# Patient Record
Sex: FEMALE | Race: WHITE | NOT HISPANIC OR LATINO | URBAN - METROPOLITAN AREA
[De-identification: names, ages, dates, MRNs, and addresses within clinical notes are randomized per-mention and may not be internally consistent; named-entity substitution may affect disease eponyms.]

---

## 2017-09-25 RX ORDER — TRIAMCINOLONE ACETONIDE 0.1 %
CREAM (GRAM) TOPICAL
Qty: 1 | Refills: 2

## 2017-09-28 ENCOUNTER — APPOINTMENT (RX ONLY)
Dept: URBAN - METROPOLITAN AREA CLINIC 349 | Facility: CLINIC | Age: 67
Setting detail: DERMATOLOGY
End: 2017-09-28

## 2017-09-28 DIAGNOSIS — I87.2 VENOUS INSUFFICIENCY (CHRONIC) (PERIPHERAL): ICD-10-CM | Status: IMPROVED

## 2017-09-28 DIAGNOSIS — D22 MELANOCYTIC NEVI: ICD-10-CM | Status: STABLE

## 2017-09-28 PROBLEM — D22.62 MELANOCYTIC NEVI OF LEFT UPPER LIMB, INCLUDING SHOULDER: Status: ACTIVE | Noted: 2017-09-28

## 2017-09-28 PROBLEM — I83.10 VARICOSE VEINS OF UNSPECIFIED LOWER EXTREMITY WITH INFLAMMATION: Status: ACTIVE | Noted: 2017-09-28

## 2017-09-28 PROBLEM — D22.5 MELANOCYTIC NEVI OF TRUNK: Status: ACTIVE | Noted: 2017-09-28

## 2017-09-28 PROBLEM — I10 ESSENTIAL (PRIMARY) HYPERTENSION: Status: ACTIVE | Noted: 2017-09-28

## 2017-09-28 PROBLEM — D22.71 MELANOCYTIC NEVI OF RIGHT LOWER LIMB, INCLUDING HIP: Status: ACTIVE | Noted: 2017-09-28

## 2017-09-28 PROBLEM — D22.61 MELANOCYTIC NEVI OF RIGHT UPPER LIMB, INCLUDING SHOULDER: Status: ACTIVE | Noted: 2017-09-28

## 2017-09-28 PROBLEM — D22.72 MELANOCYTIC NEVI OF LEFT LOWER LIMB, INCLUDING HIP: Status: ACTIVE | Noted: 2017-09-28

## 2017-09-28 PROCEDURE — 99214 OFFICE O/P EST MOD 30 MIN: CPT

## 2017-09-28 PROCEDURE — ? COUNSELING

## 2017-09-28 PROCEDURE — ? PRESCRIPTION

## 2017-09-28 PROCEDURE — ? MEDICAL PHOTOGRAPHY REVIEW

## 2017-09-28 ASSESSMENT — LOCATION SIMPLE DESCRIPTION DERM
LOCATION SIMPLE: RIGHT LOWER BACK
LOCATION SIMPLE: RIGHT POSTERIOR UPPER ARM
LOCATION SIMPLE: RIGHT POSTERIOR THIGH
LOCATION SIMPLE: LEFT POSTERIOR THIGH
LOCATION SIMPLE: LEFT POSTERIOR UPPER ARM

## 2017-09-28 ASSESSMENT — SEVERITY ASSESSMENT: SEVERITY: MILD

## 2017-09-28 ASSESSMENT — PAIN INTENSITY VAS: HOW INTENSE IS YOUR PAIN 0 BEING NO PAIN, 10 BEING THE MOST SEVERE PAIN POSSIBLE?: NO PAIN

## 2017-09-28 ASSESSMENT — LOCATION DETAILED DESCRIPTION DERM
LOCATION DETAILED: RIGHT INFERIOR LATERAL MIDBACK
LOCATION DETAILED: RIGHT PROXIMAL POSTERIOR UPPER ARM
LOCATION DETAILED: RIGHT PROXIMAL POSTERIOR THIGH
LOCATION DETAILED: LEFT DISTAL LATERAL POSTERIOR THIGH
LOCATION DETAILED: LEFT PROXIMAL POSTERIOR UPPER ARM

## 2017-09-28 ASSESSMENT — LOCATION ZONE DERM
LOCATION ZONE: LEG
LOCATION ZONE: ARM
LOCATION ZONE: TRUNK

## 2017-09-28 NOTE — PROCEDURE: MEDICAL PHOTOGRAPHY REVIEW
Detail Level: Generalized
Number Of Photographs Reviewed: 4
Review Findings: no new or changing lesions

## 2018-04-03 PROBLEM — E66.01 OBESITY, MORBID (HCC): Status: ACTIVE | Noted: 2018-04-03

## 2018-05-07 PROBLEM — I48.0 PAROXYSMAL ATRIAL FIBRILLATION (HCC): Status: ACTIVE | Noted: 2018-05-07

## 2018-05-22 ENCOUNTER — ANESTHESIA EVENT (OUTPATIENT)
Dept: SURGERY | Age: 68
End: 2018-05-22
Payer: MEDICARE

## 2018-05-22 ENCOUNTER — HOSPITAL ENCOUNTER (OUTPATIENT)
Dept: LAB | Age: 68
Discharge: HOME | End: 2018-05-22

## 2018-05-22 PROCEDURE — 88305 TISSUE EXAM BY PATHOLOGIST: CPT | Performed by: INTERNAL MEDICINE

## 2018-05-23 ENCOUNTER — HOSPITAL ENCOUNTER (OUTPATIENT)
Age: 68
Setting detail: OUTPATIENT SURGERY
Discharge: HOME | End: 2018-05-23
Attending: UROLOGY | Admitting: UROLOGY
Payer: MEDICARE

## 2018-05-23 ENCOUNTER — ANESTHESIA (OUTPATIENT)
Dept: SURGERY | Age: 68
End: 2018-05-23
Payer: MEDICARE

## 2018-05-23 VITALS
WEIGHT: 266.19 LBS | SYSTOLIC BLOOD PRESSURE: 151 MMHG | TEMPERATURE: 98.1 F | HEIGHT: 65 IN | OXYGEN SATURATION: 94 % | HEART RATE: 59 BPM | DIASTOLIC BLOOD PRESSURE: 67 MMHG | BODY MASS INDEX: 44.35 KG/M2 | RESPIRATION RATE: 14 BRPM

## 2018-05-23 PROCEDURE — 77030020782 HC GWN BAIR PAWS FLX 3M -B: Performed by: ANESTHESIOLOGY

## 2018-05-23 PROCEDURE — 77030008703 HC TU ET UNCUF COVD -A: Performed by: ANESTHESIOLOGY

## 2018-05-23 PROCEDURE — 74011250636 HC RX REV CODE- 250/636: Performed by: ANESTHESIOLOGY

## 2018-05-23 PROCEDURE — 76010000138 HC OR TIME 0.5 TO 1 HR: Performed by: UROLOGY

## 2018-05-23 PROCEDURE — 74011250637 HC RX REV CODE- 250/637: Performed by: ANESTHESIOLOGY

## 2018-05-23 PROCEDURE — 77030032490 HC SLV COMPR SCD KNE COVD -B: Performed by: UROLOGY

## 2018-05-23 PROCEDURE — 77030019908 HC STETH ESOPH SIMS -A: Performed by: ANESTHESIOLOGY

## 2018-05-23 PROCEDURE — 76210000020 HC REC RM PH II FIRST 0.5 HR: Performed by: UROLOGY

## 2018-05-23 PROCEDURE — 74011250636 HC RX REV CODE- 250/636

## 2018-05-23 PROCEDURE — 77030021678 HC GLIDESCP STAT DISP VERT -B: Performed by: ANESTHESIOLOGY

## 2018-05-23 PROCEDURE — 77030008477 HC STYL SATN SLP COVD -A: Performed by: ANESTHESIOLOGY

## 2018-05-23 PROCEDURE — 76060000033 HC ANESTHESIA 1 TO 1.5 HR: Performed by: UROLOGY

## 2018-05-23 PROCEDURE — 74011250636 HC RX REV CODE- 250/636: Performed by: UROLOGY

## 2018-05-23 PROCEDURE — 50590 FRAGMENTING OF KIDNEY STONE: CPT | Performed by: UROLOGY

## 2018-05-23 PROCEDURE — 74011000250 HC RX REV CODE- 250

## 2018-05-23 PROCEDURE — 76210000016 HC OR PH I REC 1 TO 1.5 HR: Performed by: UROLOGY

## 2018-05-23 RX ORDER — MIDAZOLAM HYDROCHLORIDE 1 MG/ML
2 INJECTION, SOLUTION INTRAMUSCULAR; INTRAVENOUS
Status: DISCONTINUED | OUTPATIENT
Start: 2018-05-23 | End: 2018-05-23 | Stop reason: HOSPADM

## 2018-05-23 RX ORDER — HYDROMORPHONE HYDROCHLORIDE 2 MG/ML
0.5 INJECTION, SOLUTION INTRAMUSCULAR; INTRAVENOUS; SUBCUTANEOUS
Status: DISCONTINUED | OUTPATIENT
Start: 2018-05-23 | End: 2018-05-23 | Stop reason: HOSPADM

## 2018-05-23 RX ORDER — SODIUM CHLORIDE, SODIUM LACTATE, POTASSIUM CHLORIDE, CALCIUM CHLORIDE 600; 310; 30; 20 MG/100ML; MG/100ML; MG/100ML; MG/100ML
75 INJECTION, SOLUTION INTRAVENOUS CONTINUOUS
Status: DISCONTINUED | OUTPATIENT
Start: 2018-05-23 | End: 2018-05-23 | Stop reason: HOSPADM

## 2018-05-23 RX ORDER — FENTANYL CITRATE 50 UG/ML
INJECTION, SOLUTION INTRAMUSCULAR; INTRAVENOUS AS NEEDED
Status: DISCONTINUED | OUTPATIENT
Start: 2018-05-23 | End: 2018-05-23 | Stop reason: HOSPADM

## 2018-05-23 RX ORDER — LIDOCAINE HYDROCHLORIDE 10 MG/ML
0.1 INJECTION INFILTRATION; PERINEURAL AS NEEDED
Status: DISCONTINUED | OUTPATIENT
Start: 2018-05-23 | End: 2018-05-23 | Stop reason: HOSPADM

## 2018-05-23 RX ORDER — FENTANYL CITRATE 50 UG/ML
100 INJECTION, SOLUTION INTRAMUSCULAR; INTRAVENOUS ONCE
Status: DISCONTINUED | OUTPATIENT
Start: 2018-05-23 | End: 2018-05-23 | Stop reason: HOSPADM

## 2018-05-23 RX ORDER — ONDANSETRON 2 MG/ML
4 INJECTION INTRAMUSCULAR; INTRAVENOUS ONCE
Status: DISCONTINUED | OUTPATIENT
Start: 2018-05-23 | End: 2018-05-23 | Stop reason: HOSPADM

## 2018-05-23 RX ORDER — NALOXONE HYDROCHLORIDE 0.4 MG/ML
0.1 INJECTION, SOLUTION INTRAMUSCULAR; INTRAVENOUS; SUBCUTANEOUS AS NEEDED
Status: DISCONTINUED | OUTPATIENT
Start: 2018-05-23 | End: 2018-05-23 | Stop reason: HOSPADM

## 2018-05-23 RX ORDER — DEXAMETHASONE SODIUM PHOSPHATE 4 MG/ML
INJECTION, SOLUTION INTRA-ARTICULAR; INTRALESIONAL; INTRAMUSCULAR; INTRAVENOUS; SOFT TISSUE AS NEEDED
Status: DISCONTINUED | OUTPATIENT
Start: 2018-05-23 | End: 2018-05-23 | Stop reason: HOSPADM

## 2018-05-23 RX ORDER — OXYCODONE HYDROCHLORIDE 5 MG/1
5 TABLET ORAL
Status: DISCONTINUED | OUTPATIENT
Start: 2018-05-23 | End: 2018-05-23 | Stop reason: HOSPADM

## 2018-05-23 RX ORDER — ALBUTEROL SULFATE 0.83 MG/ML
2.5 SOLUTION RESPIRATORY (INHALATION) AS NEEDED
Status: DISCONTINUED | OUTPATIENT
Start: 2018-05-23 | End: 2018-05-23 | Stop reason: HOSPADM

## 2018-05-23 RX ORDER — CIPROFLOXACIN 2 MG/ML
400 INJECTION, SOLUTION INTRAVENOUS ONCE
Status: COMPLETED | OUTPATIENT
Start: 2018-05-23 | End: 2018-05-23

## 2018-05-23 RX ORDER — SODIUM CHLORIDE, SODIUM LACTATE, POTASSIUM CHLORIDE, CALCIUM CHLORIDE 600; 310; 30; 20 MG/100ML; MG/100ML; MG/100ML; MG/100ML
100 INJECTION, SOLUTION INTRAVENOUS CONTINUOUS
Status: DISCONTINUED | OUTPATIENT
Start: 2018-05-23 | End: 2018-05-23 | Stop reason: HOSPADM

## 2018-05-23 RX ORDER — NALOXONE HYDROCHLORIDE 0.4 MG/ML
0.2 INJECTION, SOLUTION INTRAMUSCULAR; INTRAVENOUS; SUBCUTANEOUS AS NEEDED
Status: DISCONTINUED | OUTPATIENT
Start: 2018-05-23 | End: 2018-05-23 | Stop reason: HOSPADM

## 2018-05-23 RX ORDER — NEOSTIGMINE METHYLSULFATE 1 MG/ML
INJECTION, SOLUTION INTRAVENOUS AS NEEDED
Status: DISCONTINUED | OUTPATIENT
Start: 2018-05-23 | End: 2018-05-23 | Stop reason: HOSPADM

## 2018-05-23 RX ORDER — ONDANSETRON 2 MG/ML
INJECTION INTRAMUSCULAR; INTRAVENOUS AS NEEDED
Status: DISCONTINUED | OUTPATIENT
Start: 2018-05-23 | End: 2018-05-23 | Stop reason: HOSPADM

## 2018-05-23 RX ORDER — ROCURONIUM BROMIDE 10 MG/ML
INJECTION, SOLUTION INTRAVENOUS AS NEEDED
Status: DISCONTINUED | OUTPATIENT
Start: 2018-05-23 | End: 2018-05-23 | Stop reason: HOSPADM

## 2018-05-23 RX ORDER — PROPOFOL 10 MG/ML
INJECTION, EMULSION INTRAVENOUS AS NEEDED
Status: DISCONTINUED | OUTPATIENT
Start: 2018-05-23 | End: 2018-05-23 | Stop reason: HOSPADM

## 2018-05-23 RX ORDER — MIDAZOLAM HYDROCHLORIDE 1 MG/ML
2 INJECTION, SOLUTION INTRAMUSCULAR; INTRAVENOUS ONCE
Status: DISCONTINUED | OUTPATIENT
Start: 2018-05-23 | End: 2018-05-23 | Stop reason: HOSPADM

## 2018-05-23 RX ORDER — OXYCODONE HYDROCHLORIDE 5 MG/1
10 TABLET ORAL
Status: DISCONTINUED | OUTPATIENT
Start: 2018-05-23 | End: 2018-05-23 | Stop reason: HOSPADM

## 2018-05-23 RX ORDER — GLYCOPYRROLATE 0.2 MG/ML
INJECTION INTRAMUSCULAR; INTRAVENOUS AS NEEDED
Status: DISCONTINUED | OUTPATIENT
Start: 2018-05-23 | End: 2018-05-23 | Stop reason: HOSPADM

## 2018-05-23 RX ORDER — SODIUM CHLORIDE 0.9 % (FLUSH) 0.9 %
5-10 SYRINGE (ML) INJECTION AS NEEDED
Status: DISCONTINUED | OUTPATIENT
Start: 2018-05-23 | End: 2018-05-23 | Stop reason: HOSPADM

## 2018-05-23 RX ORDER — FAMOTIDINE 20 MG/1
20 TABLET, FILM COATED ORAL ONCE
Status: COMPLETED | OUTPATIENT
Start: 2018-05-23 | End: 2018-05-23

## 2018-05-23 RX ORDER — OXYCODONE AND ACETAMINOPHEN 5; 325 MG/1; MG/1
1 TABLET ORAL AS NEEDED
Status: DISCONTINUED | OUTPATIENT
Start: 2018-05-23 | End: 2018-05-23 | Stop reason: HOSPADM

## 2018-05-23 RX ORDER — DIPHENHYDRAMINE HYDROCHLORIDE 50 MG/ML
12.5 INJECTION, SOLUTION INTRAMUSCULAR; INTRAVENOUS
Status: DISCONTINUED | OUTPATIENT
Start: 2018-05-23 | End: 2018-05-23 | Stop reason: HOSPADM

## 2018-05-23 RX ORDER — LIDOCAINE HYDROCHLORIDE 20 MG/ML
INJECTION, SOLUTION EPIDURAL; INFILTRATION; INTRACAUDAL; PERINEURAL AS NEEDED
Status: DISCONTINUED | OUTPATIENT
Start: 2018-05-23 | End: 2018-05-23 | Stop reason: HOSPADM

## 2018-05-23 RX ADMIN — DEXAMETHASONE SODIUM PHOSPHATE 4 MG: 4 INJECTION, SOLUTION INTRA-ARTICULAR; INTRALESIONAL; INTRAMUSCULAR; INTRAVENOUS; SOFT TISSUE at 11:09

## 2018-05-23 RX ADMIN — LIDOCAINE HYDROCHLORIDE 60 MG: 20 INJECTION, SOLUTION EPIDURAL; INFILTRATION; INTRACAUDAL; PERINEURAL at 10:54

## 2018-05-23 RX ADMIN — LIDOCAINE HYDROCHLORIDE 40 MG: 20 INJECTION, SOLUTION EPIDURAL; INFILTRATION; INTRACAUDAL; PERINEURAL at 11:32

## 2018-05-23 RX ADMIN — NEOSTIGMINE METHYLSULFATE 3 MG: 1 INJECTION, SOLUTION INTRAVENOUS at 11:33

## 2018-05-23 RX ADMIN — SODIUM CHLORIDE, SODIUM LACTATE, POTASSIUM CHLORIDE, AND CALCIUM CHLORIDE 75 ML/HR: 600; 310; 30; 20 INJECTION, SOLUTION INTRAVENOUS at 08:54

## 2018-05-23 RX ADMIN — GLYCOPYRROLATE 0.4 MG: 0.2 INJECTION INTRAMUSCULAR; INTRAVENOUS at 11:33

## 2018-05-23 RX ADMIN — CIPROFLOXACIN 400 MG: 2 INJECTION, SOLUTION INTRAVENOUS at 10:50

## 2018-05-23 RX ADMIN — ROCURONIUM BROMIDE 30 MG: 10 INJECTION, SOLUTION INTRAVENOUS at 10:54

## 2018-05-23 RX ADMIN — PROPOFOL 200 MG: 10 INJECTION, EMULSION INTRAVENOUS at 10:54

## 2018-05-23 RX ADMIN — FENTANYL CITRATE 100 MCG: 50 INJECTION, SOLUTION INTRAMUSCULAR; INTRAVENOUS at 10:49

## 2018-05-23 RX ADMIN — FAMOTIDINE 20 MG: 20 TABLET, FILM COATED ORAL at 08:55

## 2018-05-23 RX ADMIN — ONDANSETRON 4 MG: 2 INJECTION INTRAMUSCULAR; INTRAVENOUS at 11:31

## 2018-05-23 NOTE — IP AVS SNAPSHOT
Mai Raphael 
 
 
 [address] [facility] [address] 
[phone] Patient: Eloina Fung MRN: [id number] DBX:[id number] About your hospitalization You were admitted on:  May 23, 2018 You last received care in the:  [facility] PACU You were discharged on:  May 23, 2018 Why you were hospitalized Your primary diagnosis was:  Not on File Follow-up Information Follow up With Details Comments Contact Info MD Urvashi Dash [address] [facility] [address] [phone] Jordy Tovar MD  You need to see Alfred Jones NP, in 2-3 weeks with JOY. Office will call you to schedule this. [address] [facility] [address] [phone] Discharge Orders None A check vianney indicates which time of day the medication should be taken. My Medications CHANGE how you take these medications Instructions Each Dose to Equal  
 Morning Noon Evening Bedtime  
 levothyroxine 175 mcg tablet Commonly known as:  SYNTHROID What changed:   
- when to take this 
- additional instructions Your last dose was: Your next dose is: One daily  
     
   
   
   
  
 olmesartan-hydroCHLOROthiazide 40-12.5 mg per tablet Commonly known as:  BENICAR HCT What changed:  how much to take Your last dose was: Your next dose is: Take 1 Tab by mouth daily. 1 Tab  
    
   
   
   
  
 rivaroxaban 20 mg Tab tablet Commonly known as:  Elham Lemon What changed:  additional instructions Your last dose was: Your next dose is: Take 1 Tab by mouth daily (with dinner). 20 mg  
    
   
   
   
  
 rosuvastatin 20 mg tablet Commonly known as:  CRESTOR What changed:   
- how much to take 
- additional instructions Your last dose was: Your next dose is: Take 1 Tab by mouth nightly.   
 20 mg  
    
   
   
   
  
  
 CONTINUE taking these medications Instructions Each Dose to Equal  
 Morning Noon Evening Bedtime  
 acetaminophen 325 mg tablet Commonly known as:  TYLENOL Your last dose was: Your next dose is: Take  by mouth every four (4) hours as needed for Pain. COLLAGEN PLUS VITAMIN C PO Your last dose was: Your next dose is: Take  by mouth two (2) times a day. flecainide 50 mg tablet Commonly known as:  TAMBOCOR Your last dose was: Your next dose is: TAKE 1 TABLET BY MOUTH TWICE A DAY  
     
   
   
   
  
 magnesium oxide 500 mg Tab Your last dose was: Your next dose is: Take 500 mg by mouth two (2) times a day. 500 mg  
    
   
   
   
  
 melatonin 5 mg Cap capsule Your last dose was: Your next dose is: Take 5 mg by mouth nightly. 5 mg OSTEO BI-FLEX PO Your last dose was: Your next dose is: Take  by mouth two (2) times a day. Last dose 5/19/18 STOOL SOFTENER PO Your last dose was: Your next dose is: Take  by mouth. TURMERIC ROOT EXTRACT PO Your last dose was: Your next dose is: Take  by mouth nightly. Last dose 5/20/18 Discharge Instructions ACTIVITY · As tolerated and as directed by your doctor. · Walking and mild exercise help to pass the stone fragments. DIET · Clear liquids until no nausea and vomiting; then resume light diet for the first day · Advance to regular diet on second day, unless your doctor orders otherwise. · If nauseated and/ or vomiting, call your doctor. · Drink at least 8 glasses of water a day (avoid caffeinated beverages). PAIN 
· Take pain medication as directed. · Call your doctor if pain is NOT relieved by medication. · DO NOT take aspirin or blood thinners until directed by your doctor. CALL YOUR DOCTOR IF  
· Expect blood-tinged urine. Call your doctor if it lasts more than 72 hours or if you cannot see through the urine. · Aches, chills, or fever of 101 degree F or above Lithotripsy does not make your stone disappear. The treatment is meant to crush your stone so that the fragments can be passed. Strain your urine, save any fragments, and take them to your doctor. The fragments may not begin to pass for up to 1-2 months. You may experience slight bruising at the treated site. DISCHARGE SUMMARY from Nurse PATIENT INSTRUCTIONS: 
 
 
After general anesthesia or intravenous sedation, for 24 hours or while taking prescription Narcotics: · Limit your activities · Do not drive and operate hazardous machinery · Do not make important personal or business decisions · Do  not drink alcoholic beverages · If you have not urinated within 8 hours after discharge, please contact your surgeon on call. *  Please give a list of your current medications to your Primary Care Provider. *  Please update this list whenever your medications are discontinued, doses are 
    changed, or new medications (including over-the-counter products) are added. *  Please carry medication information at all times in case of emergency situations. These are general instructions for a healthy lifestyle: No smoking/ No tobacco products/ Avoid exposure to second hand smoke Surgeon General's Warning:  Quitting smoking now greatly reduces serious risk to your health. Obesity, smoking, and sedentary lifestyle greatly increases your risk for illness A healthy diet, regular physical exercise & weight monitoring are important for maintaining a healthy lifestyle You may be retaining fluid if you have a history of heart failure or if you experience any of the following symptoms:  Weight gain of 3 pounds or more overnight or 5 pounds in a week, increased swelling in our hands or feet or shortness of breath while lying flat in bed. Please call your doctor as soon as you notice any of these symptoms; do not wait until your next office visit. Recognize signs and symptoms of STROKE: 
 
F-face looks uneven A-arms unable to move or move unevenly S-speech slurred or non-existent T-time-call 911 as soon as signs and symptoms begin-DO NOT go Back to bed or wait to see if you get better-TIME IS BRAIN. ACO Transitions of Care Introducing [employer] [address] Hurt offers a voluntary care coordination program to provide high quality service and care to [facility] fee-for-service beneficiaries. Stuttgartel Dunne was designed to help you enhance your health and well-being through the following services: ? Transitions of Care  support for individuals who are transitioning from one care setting to another (example: Hospital to home). ? Chronic and Complex Care Coordination  support for individuals and caregivers of those with serious or chronic illnesses or with more than one chronic (ongoing) condition and those who take a number of different medications. If you meet specific medical criteria, a On license of [facility] may call you directly to coordinate your care with your primary care physician and your other care providers. For questions about the [facility] programs, please, contact your physicians office. For general questions or additional information about Accountable Care Organizations: 
Please visit www.medicare.gov/acos. html or call 1-800-MEDICARE (7-751.652.9386) TTY users should call 4-500.764.8822. Introducing \[facility]\"" & HEALTH SERVICES! Dear Renee Bateman: 
Thank you for requesting a [email or web address] account.   Our records indicate that you already have an active [employer] account. You can access your account anytime at [email or web address] Did you know that you can access your hospital and ER discharge instructions at any time in [employer]? You can also review all of your test results from your hospital stay or ER visit. Additional Information If you have questions, please visit the Frequently Asked Questions section of the [employer] website at [email or web address] Remember, [employer] is NOT to be used for urgent needs. For medical emergencies, dial 911. Now available from your iPhone and Android! Introducing Rashi Mata As a [employer] patient, I wanted to make you aware of our electronic visit tool called Rashi Mata. [employer] 24/7 allows you to connect within minutes with a medical provider 24 hours a day, seven days a week via a mobile device or tablet or logging into a secure website from your computer. You can access Rashi Mata from anywhere in the [place]. A virtual visit might be right for you when you have a simple condition and feel like you just dont want to get out of bed, or cant get away from work for an appointment, when your regular [employer] provider is not available (evenings, weekends or holidays), or when youre out of town and need minor care. Electronic visits cost only $49 and if the [employer] 24/7 provider determines a prescription is needed to treat your condition, one can be electronically transmitted to a nearby pharmacy*. Please take a moment to enroll today if you have not already done so. The enrollment process is free and takes just a few minutes. To enroll, please download the [employer] 24/7 barbra to your tablet or phone, or visit [email or web address] to enroll on your computer.    
And, as an [address] [place] [address] patient with a [employer] account, the results of your visits will be scanned into your electronic medical record and your primary care provider will be able to view the scanned results. We urge you to continue to see your regular [place] provider for your ongoing medical care. And while your primary care provider may not be the one available when you seek a Rashi Valentinofin virtual visit, the peace of mind you get from getting a real diagnosis real time can be priceless. For more information on Rashi Betterflyabelfin, view our Frequently Asked Questions (FAQs) at [email or web address]. Sincerely, 
 
Aaron Madrigal MD 
Chief Medical Officer Nicola Hurt *:  certain medications cannot be prescribed via [employer] Providers Seen During Your Hospitalization Provider Specialty Primary office phone Radha Kern MD Urology [phone] Your Primary Care Physician (PCP) Primary Care Physician Office Phone Office Fax Bin Aylin [phone] You are allergic to the following Allergen Reactions Penicillins Anaphylaxis \"knots under skin and then SOB\"- \"I had it for 10 days before it happened\" Recent Documentation Height Weight BMI OB Status Smoking Status 1.651 m 120.7 kg 44.3 kg/m2 Hysterectomy Never Smoker Emergency Contacts Name Discharge Info Relation Home Work Mobile Silk [5] [phone] Patient Belongings The following personal items are in your possession at time of discharge: 
  Dental Appliances: None         Home Medications: None   Jewelry: None  Clothing: Shirt, Pants, Footwear    Other Valuables: None Please provide this summary of care documentation to your next provider. Signatures-by signing, you are acknowledging that this After Visit Summary has been reviewed with you and you have received a copy. Patient Signature:  ____________________________________________________________ Date:  ____________________________________________________________  
  
Chel Schuyler Provider Signature:  ____________________________________________________________ Date:  ____________________________________________________________

## 2018-05-23 NOTE — OP NOTES
Operative Note                Patient: Samreen Munoz [id number]    Date of Surgery: 05/23/18    Preoperative Diagnosis: 8 mm RIGHT UPJ stone    Postoperative Diagnosis:  same    Surgeon(s) and Role:     * Terrance Vazquez MD - Primary     Anesthesia:  General     Procedure: Procedure(s):  RIGHT EXTRACORPORAL SHOCKWAVE LITHOTRIPSY (ESWL)     Indications:     Discussed the risk of surgery including infection, hematoma, bleeding, recurrence or persistence of symptoms or stone, and the risks of general anesthetic. The patient understands the risks, any and all questions were answered to the patient's satisfaction and they freely signed the consent for operation. Procedure in Detail:  Patient was taken to the lithotripsy suite. Anesthesia was induced via the anesthesiology service. Using flouroscopy for guidance, a total of 3000 shocks were delivered in a non-gaited fashion. No cardiac ectopy was experienced. Shock rate was begun at 60 shocks per minute and then increased to 90 shocks per minute. Energy level was begun at 7 and gradually increased to a maximum of 11. Findings: Patient tolerated the procedure well. At the end of the procedure, the stone appeared to have fractured.       Estimated Blood Loss:  none    Specimens: * No specimens in log *             Drains: none                 Implants: * No implants in log *           Signed By: Chilango Edwards MD

## 2018-05-23 NOTE — PERIOP NOTES
Preoperative flank skin condition: clear  Lead shield: Yes  Patient ear protection: Yes   Gel applied to patient's flank and Lithotripsy head: Yes   Starting power level: 7  Shock start time (first  fluoroscopy):11:01  Shock stop time (last lithotripsy shock): 11:38  Ending power level: 11  Total shocks: 3000  Total fluoroscopy time: 3.05  Postoperative flank skin condition: red

## 2018-05-23 NOTE — ANESTHESIA PREPROCEDURE EVALUATION
Anesthetic History   No history of anesthetic complications            Review of Systems / Medical History  Patient summary reviewed, nursing notes reviewed and pertinent labs reviewed    Pulmonary  Within defined limits        Undiagnosed apnea         Neuro/Psych   Within defined limits           Cardiovascular    Hypertension: well controlled        Dysrhythmias : atrial fibrillation      Exercise tolerance: >4 METS     GI/Hepatic/Renal         Renal disease: stones       Endo/Other      Hypothyroidism: well controlled  Morbid obesity     Other Findings              Physical Exam    Airway  Mallampati: II  TM Distance: 4 - 6 cm  Neck ROM: normal range of motion   Mouth opening: Normal     Cardiovascular    Rhythm: regular           Dental    Dentition: Caps/crowns     Pulmonary  Breath sounds clear to auscultation               Abdominal  GI exam deferred       Other Findings            Anesthetic Plan    ASA: 3  Anesthesia type: general            Anesthetic plan and risks discussed with: Patient

## 2018-05-23 NOTE — ANESTHESIA POSTPROCEDURE EVALUATION
Post-Anesthesia Evaluation and Assessment    Patient: Foster Mora MRN: [id number]  SSN: [id number]    YOB: 1950  Age: 79 y.o. Sex: female       Cardiovascular Function/Vital Signs  Visit Vitals    /67    Pulse (!) 59    Temp 36.7 °C (98.1 °F)    Resp 14    Ht 5' 5\" (1.651 m)    Wt 120.7 kg (266 lb 3 oz)    SpO2 94%    BMI 44.3 kg/m2       Patient is status post general anesthesia for Procedure(s):  RIGHT LITHOTRIPSY EXTRACORPOREAL SHOCKWAVE ESWL. Nausea/Vomiting: None    Postoperative hydration reviewed and adequate. Pain:  Pain Scale 1: Numeric (0 - 10) (05/23/18 1228)  Pain Intensity 1: 0 (05/23/18 1228)   Managed    Neurological Status:   Neuro (WDL): Within Defined Limits (05/23/18 1228)  Neuro  Neurologic State: Drowsy (05/23/18 1151)  Orientation Level: Oriented X4 (05/23/18 1151)  Cognition: Follows commands (05/23/18 1151)  Speech: Clear (05/23/18 1151)  LUE Motor Response: Purposeful (05/23/18 1151)  LLE Motor Response: Purposeful (05/23/18 1151)  RUE Motor Response: Purposeful (05/23/18 1151)  RLE Motor Response: Purposeful (05/23/18 1151)   At baseline    Mental Status and Level of Consciousness: Arousable    Pulmonary Status:   O2 Device: Room air (05/23/18 1228)   Adequate oxygenation and airway patent    Complications related to anesthesia: None    Post-anesthesia assessment completed.  No concerns    Signed By: Liam Emery MD     May 23, 2018

## 2018-12-21 RX ORDER — TRIAMCINOLONE ACETONIDE 0.1 %
CREAM (GRAM) TOPICAL
Qty: 1 | Refills: 2 | Status: ERX

## 2018-12-27 ENCOUNTER — APPOINTMENT (RX ONLY)
Dept: URBAN - METROPOLITAN AREA CLINIC 349 | Facility: CLINIC | Age: 68
Setting detail: DERMATOLOGY
End: 2018-12-27

## 2018-12-27 DIAGNOSIS — R60.0 LOCALIZED EDEMA: ICD-10-CM

## 2018-12-27 DIAGNOSIS — I87.2 VENOUS INSUFFICIENCY (CHRONIC) (PERIPHERAL): ICD-10-CM | Status: STABLE

## 2018-12-27 DIAGNOSIS — L57.0 ACTINIC KERATOSIS: ICD-10-CM

## 2018-12-27 DIAGNOSIS — D22 MELANOCYTIC NEVI: ICD-10-CM | Status: STABLE

## 2018-12-27 PROBLEM — D22.5 MELANOCYTIC NEVI OF TRUNK: Status: ACTIVE | Noted: 2018-12-27

## 2018-12-27 PROBLEM — L55.1 SUNBURN OF SECOND DEGREE: Status: ACTIVE | Noted: 2018-12-27

## 2018-12-27 PROBLEM — M12.9 ARTHROPATHY, UNSPECIFIED: Status: ACTIVE | Noted: 2018-12-27

## 2018-12-27 PROBLEM — E03.9 HYPOTHYROIDISM, UNSPECIFIED: Status: ACTIVE | Noted: 2018-12-27

## 2018-12-27 PROCEDURE — ? OTHER

## 2018-12-27 PROCEDURE — 17000 DESTRUCT PREMALG LESION: CPT

## 2018-12-27 PROCEDURE — ? MEDICAL PHOTOGRAPHY REVIEW

## 2018-12-27 PROCEDURE — 99214 OFFICE O/P EST MOD 30 MIN: CPT | Mod: 25

## 2018-12-27 PROCEDURE — ? PRESCRIPTION

## 2018-12-27 PROCEDURE — ? LIQUID NITROGEN

## 2018-12-27 PROCEDURE — ? COUNSELING

## 2018-12-27 ASSESSMENT — LOCATION SIMPLE DESCRIPTION DERM
LOCATION SIMPLE: RIGHT LOWER BACK
LOCATION SIMPLE: LEFT PRETIBIAL REGION
LOCATION SIMPLE: LEFT ZYGOMA
LOCATION SIMPLE: RIGHT PRETIBIAL REGION

## 2018-12-27 ASSESSMENT — LOCATION DETAILED DESCRIPTION DERM
LOCATION DETAILED: LEFT LATERAL ZYGOMA
LOCATION DETAILED: LEFT DISTAL PRETIBIAL REGION
LOCATION DETAILED: RIGHT DISTAL PRETIBIAL REGION
LOCATION DETAILED: RIGHT INFERIOR LATERAL MIDBACK

## 2018-12-27 ASSESSMENT — LOCATION ZONE DERM
LOCATION ZONE: FACE
LOCATION ZONE: TRUNK
LOCATION ZONE: LEG

## 2018-12-27 ASSESSMENT — PAIN INTENSITY VAS
HOW INTENSE IS YOUR PAIN 0 BEING NO PAIN, 10 BEING THE MOST SEVERE PAIN POSSIBLE?: 1/10 PAIN
HOW INTENSE IS YOUR PAIN 0 BEING NO PAIN, 10 BEING THE MOST SEVERE PAIN POSSIBLE?: 0

## 2018-12-27 ASSESSMENT — SEVERITY ASSESSMENT: SEVERITY: MILD

## 2018-12-27 NOTE — PROCEDURE: OTHER
Detail Level: Detailed
Other (Free Text): Patient wears compression stockings occasionally.  Patient takes breaks from steroid use every two weeks.\\nPatient gets a diuretic from her Primary care physician occasionally. Patient denies hypertension
Other (Free Text): Patient wears compression stockings occasionally
Note Text (......Xxx Chief Complaint.): This diagnosis correlates with the

## 2018-12-27 NOTE — PROCEDURE: MEDICAL PHOTOGRAPHY REVIEW
Detail Level: Generalized
Review Findings: no new or changing lesions
Number Of Photographs Reviewed: 4

## 2018-12-27 NOTE — PROCEDURE: LIQUID NITROGEN
Render Post-Care Instructions In Note?: no
Number Of Freeze-Thaw Cycles: 2 freeze-thaw cycles
Detail Level: Detailed
Consent: The patient's consent was obtained including but not limited to risks of crusting, scabbing, blistering, scarring, darker or lighter pigmentary change, recurrence, incomplete removal and infection.
Post-Care Instructions: I reviewed with the patient in detail post-care instructions. Patient is to wear sunprotection, and avoid picking at any of the treated lesions. Pt may apply Vaseline to crusted or scabbing areas.
Duration Of Freeze Thaw-Cycle (Seconds): 3

## 2020-01-01 ENCOUNTER — HOSPITAL ENCOUNTER (INPATIENT)
Age: 70
LOS: 1 days | DRG: 871 | End: 2020-01-23
Attending: EMERGENCY MEDICINE | Admitting: INTERNAL MEDICINE
Payer: MEDICARE

## 2020-01-01 ENCOUNTER — APPOINTMENT (OUTPATIENT)
Dept: CT IMAGING | Age: 70
DRG: 871 | End: 2020-01-01
Attending: EMERGENCY MEDICINE
Payer: MEDICARE

## 2020-01-01 ENCOUNTER — APPOINTMENT (OUTPATIENT)
Dept: GENERAL RADIOLOGY | Age: 70
DRG: 871 | End: 2020-01-01
Attending: EMERGENCY MEDICINE
Payer: MEDICARE

## 2020-01-01 ENCOUNTER — APPOINTMENT (OUTPATIENT)
Dept: GENERAL RADIOLOGY | Age: 70
DRG: 871 | End: 2020-01-01
Attending: INTERNAL MEDICINE
Payer: MEDICARE

## 2020-01-01 VITALS
OXYGEN SATURATION: 67 % | RESPIRATION RATE: 28 BRPM | BODY MASS INDEX: 50.02 KG/M2 | DIASTOLIC BLOOD PRESSURE: 34 MMHG | TEMPERATURE: 92.7 F | HEIGHT: 64 IN | WEIGHT: 293 LBS | SYSTOLIC BLOOD PRESSURE: 62 MMHG

## 2020-01-01 DIAGNOSIS — J96.01 ACUTE RESPIRATORY FAILURE WITH HYPOXIA (HCC): ICD-10-CM

## 2020-01-01 DIAGNOSIS — A41.9 SEPSIS, DUE TO UNSPECIFIED ORGANISM, UNSPECIFIED WHETHER ACUTE ORGAN DYSFUNCTION PRESENT (HCC): ICD-10-CM

## 2020-01-01 DIAGNOSIS — R65.21 SEPSIS WITH ACUTE ORGAN DYSFUNCTION AND SEPTIC SHOCK, DUE TO UNSPECIFIED ORGANISM, UNSPECIFIED TYPE (HCC): ICD-10-CM

## 2020-01-01 DIAGNOSIS — A41.9 SEPSIS WITH ACUTE ORGAN DYSFUNCTION AND SEPTIC SHOCK, DUE TO UNSPECIFIED ORGANISM, UNSPECIFIED TYPE (HCC): ICD-10-CM

## 2020-01-01 DIAGNOSIS — E66.01 OBESITY, MORBID (HCC): Chronic | ICD-10-CM

## 2020-01-01 DIAGNOSIS — N17.9 ACUTE RENAL FAILURE, UNSPECIFIED ACUTE RENAL FAILURE TYPE (HCC): ICD-10-CM

## 2020-01-01 DIAGNOSIS — I46.9 CARDIAC ARREST (HCC): Primary | ICD-10-CM

## 2020-01-01 DIAGNOSIS — N30.00 ACUTE CYSTITIS WITHOUT HEMATURIA: ICD-10-CM

## 2020-01-01 LAB
ADMINISTERED INITIALS, ADMINIT: NORMAL
ALBUMIN SERPL-MCNC: 1.4 G/DL (ref 3.2–4.6)
ALBUMIN SERPL-MCNC: 1.9 G/DL (ref 3.2–4.6)
ALBUMIN SERPL-MCNC: 2.2 G/DL (ref 3.2–4.6)
ALBUMIN/GLOB SERPL: 0.5 {RATIO} (ref 1.2–3.5)
ALBUMIN/GLOB SERPL: 0.5 {RATIO} (ref 1.2–3.5)
ALBUMIN/GLOB SERPL: 0.6 {RATIO} (ref 1.2–3.5)
ALP SERPL-CCNC: 219 U/L (ref 50–136)
ALP SERPL-CCNC: 320 U/L (ref 50–136)
ALP SERPL-CCNC: 363 U/L (ref 50–136)
ALT SERPL-CCNC: 112 U/L (ref 12–65)
ALT SERPL-CCNC: 246 U/L (ref 12–65)
ALT SERPL-CCNC: 321 U/L (ref 12–65)
ANION GAP SERPL CALC-SCNC: 10 MMOL/L (ref 7–16)
ANION GAP SERPL CALC-SCNC: 14 MMOL/L (ref 7–16)
ANION GAP SERPL CALC-SCNC: 21 MMOL/L (ref 7–16)
ANION GAP SERPL CALC-SCNC: 24 MMOL/L (ref 7–16)
APPEARANCE UR: ABNORMAL
APPEARANCE UR: ABNORMAL
APTT PPP: 40.7 SEC (ref 24.7–39.8)
APTT PPP: 53.3 SEC (ref 24.7–39.8)
ARTERIAL PATENCY WRIST A: ABNORMAL
ARTERIAL PATENCY WRIST A: YES
AST SERPL-CCNC: 163 U/L (ref 15–37)
AST SERPL-CCNC: 440 U/L (ref 15–37)
AST SERPL-CCNC: 534 U/L (ref 15–37)
ATRIAL RATE: 91 BPM
BACTERIA URNS QL MICRO: ABNORMAL /HPF
BACTERIA URNS QL MICRO: ABNORMAL /HPF
BASE DEFICIT BLD-SCNC: 12 MMOL/L
BASE DEFICIT BLD-SCNC: 12 MMOL/L
BASE DEFICIT BLD-SCNC: 14 MMOL/L
BASE DEFICIT BLD-SCNC: 18 MMOL/L
BASE DEFICIT BLD-SCNC: 8 MMOL/L
BASE DEFICIT BLD-SCNC: 9 MMOL/L
BASOPHILS # BLD: 0 K/UL (ref 0–0.2)
BASOPHILS NFR BLD: 1 % (ref 0–2)
BDY SITE: ABNORMAL
BILIRUB DIRECT SERPL-MCNC: 0.9 MG/DL
BILIRUB SERPL-MCNC: 0.9 MG/DL (ref 0.2–1.1)
BILIRUB SERPL-MCNC: 0.9 MG/DL (ref 0.2–1.1)
BILIRUB SERPL-MCNC: 1.3 MG/DL (ref 0.2–1.1)
BILIRUB UR QL: ABNORMAL
BILIRUB UR QL: ABNORMAL
BLASTS NFR BLD MANUAL: 3 %
BODY TEMPERATURE: 32.5
BODY TEMPERATURE: 33.2
BUN SERPL-MCNC: 55 MG/DL (ref 8–23)
BUN SERPL-MCNC: 58 MG/DL (ref 8–23)
BUN SERPL-MCNC: 65 MG/DL (ref 8–23)
BUN SERPL-MCNC: 68 MG/DL (ref 8–23)
CA-I BLD-MCNC: 0.96 MMOL/L (ref 1.12–1.32)
CALCIUM SERPL-MCNC: 5.7 MG/DL (ref 8.3–10.4)
CALCIUM SERPL-MCNC: 6.4 MG/DL (ref 8.3–10.4)
CALCIUM SERPL-MCNC: 8.1 MG/DL (ref 8.3–10.4)
CALCIUM SERPL-MCNC: 9.5 MG/DL (ref 8.3–10.4)
CALCULATED R AXIS, ECG10: 70 DEGREES
CALCULATED T AXIS, ECG11: 22 DEGREES
CASTS URNS QL MICRO: 0 /LPF
CASTS URNS QL MICRO: ABNORMAL /LPF
CHLORIDE SERPL-SCNC: 101 MMOL/L (ref 98–107)
CHLORIDE SERPL-SCNC: 112 MMOL/L (ref 98–107)
CHLORIDE SERPL-SCNC: 113 MMOL/L (ref 98–107)
CHLORIDE SERPL-SCNC: 99 MMOL/L (ref 98–107)
CK SERPL-CCNC: 532 U/L (ref 21–215)
CK SERPL-CCNC: 536 U/L (ref 21–215)
CO2 BLD-SCNC: 13 MMOL/L
CO2 BLD-SCNC: 16 MMOL/L
CO2 BLD-SCNC: 20 MMOL/L
CO2 BLD-SCNC: 21 MMOL/L
CO2 BLD-SCNC: 25 MMOL/L
CO2 SERPL-SCNC: 16 MMOL/L (ref 21–32)
CO2 SERPL-SCNC: 17 MMOL/L (ref 21–32)
CO2 SERPL-SCNC: 18 MMOL/L (ref 21–32)
CO2 SERPL-SCNC: 21 MMOL/L (ref 21–32)
COLLECT TIME,HTIME: 1310
COLLECT TIME,HTIME: 1440
COLLECT TIME,HTIME: 1600
COLLECT TIME,HTIME: 2045
COLLECT TIME,HTIME: 2350
COLLECT TIME,HTIME: 315
COLOR UR: ABNORMAL
COLOR UR: ABNORMAL
CREAT SERPL-MCNC: 2.93 MG/DL (ref 0.6–1)
CREAT SERPL-MCNC: 2.96 MG/DL (ref 0.6–1)
CREAT SERPL-MCNC: 4.39 MG/DL (ref 0.6–1)
CREAT SERPL-MCNC: 4.59 MG/DL (ref 0.6–1)
CRYSTALS URNS QL MICRO: 0 /LPF
D50 ADMINISTERED, D50ADM: 0 ML
D50 ORDER, D50ORD: 0 ML
DIAGNOSIS, 93000: NORMAL
DIFFERENTIAL METHOD BLD: ABNORMAL
DIFFERENTIAL METHOD BLD: ABNORMAL
EOSINOPHIL # BLD: 0 K/UL (ref 0–0.8)
EOSINOPHIL # BLD: 0.2 K/UL (ref 0–0.8)
EOSINOPHIL NFR BLD MANUAL: 1 % (ref 1–8)
EOSINOPHIL NFR BLD: 0 % (ref 0.5–7.8)
EPI CELLS #/AREA URNS HPF: ABNORMAL /HPF
EPI CELLS #/AREA URNS HPF: ABNORMAL /HPF
ERYTHROCYTE [DISTWIDTH] IN BLOOD BY AUTOMATED COUNT: 13.7 % (ref 11.9–14.6)
ERYTHROCYTE [DISTWIDTH] IN BLOOD BY AUTOMATED COUNT: 13.8 % (ref 11.9–14.6)
ERYTHROCYTE [DISTWIDTH] IN BLOOD BY AUTOMATED COUNT: 14.3 % (ref 11.9–14.6)
EST. AVERAGE GLUCOSE BLD GHB EST-MCNC: 148 MG/DL
EXHALED MINUTE VOLUME, VE: 10 L/MIN
EXHALED MINUTE VOLUME, VE: 13 L/MIN
EXHALED MINUTE VOLUME, VE: 5.5 L/MIN
EXHALED MINUTE VOLUME, VE: 6.5 L/MIN
EXHALED MINUTE VOLUME, VE: 8.5 L/MIN
GAS FLOW.O2 O2 DELIVERY SYS: ABNORMAL L/MIN
GAS FLOW.O2 SETTING OXYMISER: 20 BPM
GAS FLOW.O2 SETTING OXYMISER: 28 BPM
GAS FLOW.O2 SETTING OXYMISER: 28 BPM
GLOBULIN SER CALC-MCNC: 3 G/DL (ref 2.3–3.5)
GLOBULIN SER CALC-MCNC: 3.8 G/DL (ref 2.3–3.5)
GLOBULIN SER CALC-MCNC: 4 G/DL (ref 2.3–3.5)
GLSCOM COMMENTS: NORMAL
GLUCOSE BLD STRIP.AUTO-MCNC: 201 MG/DL (ref 65–100)
GLUCOSE BLD STRIP.AUTO-MCNC: 209 MG/DL (ref 65–100)
GLUCOSE BLD STRIP.AUTO-MCNC: 219 MG/DL (ref 65–100)
GLUCOSE BLD STRIP.AUTO-MCNC: 221 MG/DL (ref 65–100)
GLUCOSE BLD STRIP.AUTO-MCNC: 224 MG/DL (ref 65–100)
GLUCOSE BLD STRIP.AUTO-MCNC: 232 MG/DL (ref 65–100)
GLUCOSE BLD STRIP.AUTO-MCNC: 233 MG/DL (ref 65–100)
GLUCOSE BLD STRIP.AUTO-MCNC: 237 MG/DL (ref 65–100)
GLUCOSE BLD STRIP.AUTO-MCNC: 239 MG/DL (ref 65–100)
GLUCOSE BLD STRIP.AUTO-MCNC: 249 MG/DL (ref 65–100)
GLUCOSE BLD STRIP.AUTO-MCNC: 254 MG/DL (ref 65–100)
GLUCOSE BLD STRIP.AUTO-MCNC: 266 MG/DL (ref 65–100)
GLUCOSE BLD STRIP.AUTO-MCNC: 271 MG/DL (ref 65–100)
GLUCOSE SERPL-MCNC: 196 MG/DL (ref 65–100)
GLUCOSE SERPL-MCNC: 215 MG/DL (ref 65–100)
GLUCOSE SERPL-MCNC: 229 MG/DL (ref 65–100)
GLUCOSE SERPL-MCNC: 253 MG/DL (ref 65–100)
GLUCOSE UR STRIP.AUTO-MCNC: 100 MG/DL
GLUCOSE UR STRIP.AUTO-MCNC: NEGATIVE MG/DL
GLUCOSE, GLC: 209 MG/DL
GLUCOSE, GLC: 219 MG/DL
GLUCOSE, GLC: 221 MG/DL
GLUCOSE, GLC: 224 MG/DL
GLUCOSE, GLC: 232 MG/DL
GLUCOSE, GLC: 233 MG/DL
GLUCOSE, GLC: 237 MG/DL
GLUCOSE, GLC: 239 MG/DL
GLUCOSE, GLC: 249 MG/DL
GLUCOSE, GLC: 254 MG/DL
GLUCOSE, GLC: 266 MG/DL
GLUCOSE, GLC: 271 MG/DL
HBA1C MFR BLD: 6.8 % (ref 4.8–6)
HCO3 BLD-SCNC: 11.8 MMOL/L (ref 22–26)
HCO3 BLD-SCNC: 15 MMOL/L (ref 22–26)
HCO3 BLD-SCNC: 15.7 MMOL/L (ref 22–26)
HCO3 BLD-SCNC: 18.5 MMOL/L (ref 22–26)
HCO3 BLD-SCNC: 19.5 MMOL/L (ref 22–26)
HCO3 BLD-SCNC: 22.9 MMOL/L (ref 22–26)
HCT VFR BLD AUTO: 33.7 % (ref 35.8–46.3)
HCT VFR BLD AUTO: 37.8 % (ref 35.8–46.3)
HCT VFR BLD AUTO: 44 % (ref 35.8–46.3)
HGB BLD-MCNC: 10.4 G/DL (ref 11.7–15.4)
HGB BLD-MCNC: 11.9 G/DL (ref 11.7–15.4)
HGB BLD-MCNC: 13.4 G/DL (ref 11.7–15.4)
HGB UR QL STRIP: ABNORMAL
HGB UR QL STRIP: ABNORMAL
HIGH TARGET, HITG: 250 MG/DL
IMM GRANULOCYTES # BLD AUTO: 0.9 K/UL (ref 0–0.5)
IMM GRANULOCYTES NFR BLD AUTO: 19 % (ref 0–5)
INR PPP: 3.5
INR PPP: 4.1
INSPIRATION.DURATION SETTING TIME VENT: 1 SEC
INSULIN ADMINSTERED, INSADM: 10.3 UNITS/HOUR
INSULIN ADMINSTERED, INSADM: 3.2 UNITS/HOUR
INSULIN ADMINSTERED, INSADM: 3.2 UNITS/HOUR
INSULIN ADMINSTERED, INSADM: 3.5 UNITS/HOUR
INSULIN ADMINSTERED, INSADM: 3.8 UNITS/HOUR
INSULIN ADMINSTERED, INSADM: 5.8 UNITS/HOUR
INSULIN ADMINSTERED, INSADM: 7.5 UNITS/HOUR
INSULIN ADMINSTERED, INSADM: 8.2 UNITS/HOUR
INSULIN ADMINSTERED, INSADM: 8.4 UNITS/HOUR
INSULIN ADMINSTERED, INSADM: 8.6 UNITS/HOUR
INSULIN ADMINSTERED, INSADM: 8.7 UNITS/HOUR
INSULIN ADMINSTERED, INSADM: 9 UNITS/HOUR
INSULIN ORDER, INSORD: 10.3 UNITS/HOUR
INSULIN ORDER, INSORD: 3.2 UNITS/HOUR
INSULIN ORDER, INSORD: 3.2 UNITS/HOUR
INSULIN ORDER, INSORD: 3.5 UNITS/HOUR
INSULIN ORDER, INSORD: 3.8 UNITS/HOUR
INSULIN ORDER, INSORD: 5.8 UNITS/HOUR
INSULIN ORDER, INSORD: 7.5 UNITS/HOUR
INSULIN ORDER, INSORD: 8.2 UNITS/HOUR
INSULIN ORDER, INSORD: 8.4 UNITS/HOUR
INSULIN ORDER, INSORD: 8.6 UNITS/HOUR
INSULIN ORDER, INSORD: 8.7 UNITS/HOUR
INSULIN ORDER, INSORD: 9 UNITS/HOUR
KETONES UR QL STRIP.AUTO: 15 MG/DL
KETONES UR QL STRIP.AUTO: 15 MG/DL
LACTATE SERPL-SCNC: 13 MMOL/L (ref 0.4–2)
LACTATE SERPL-SCNC: 3.1 MMOL/L (ref 0.4–2)
LACTATE SERPL-SCNC: 7.2 MMOL/L (ref 0.4–2)
LEUKOCYTE ESTERASE UR QL STRIP.AUTO: ABNORMAL
LEUKOCYTE ESTERASE UR QL STRIP.AUTO: ABNORMAL
LOW TARGET, LOT: 150 MG/DL
LYMPHOCYTES # BLD: 1.2 K/UL (ref 0.5–4.6)
LYMPHOCYTES # BLD: 8 K/UL (ref 0.5–4.6)
LYMPHOCYTES NFR BLD MANUAL: 33 % (ref 16–44)
LYMPHOCYTES NFR BLD: 25 % (ref 13–44)
MAGNESIUM SERPL-MCNC: 1.8 MG/DL (ref 1.8–2.4)
MAGNESIUM SERPL-MCNC: 2.4 MG/DL (ref 1.8–2.4)
MAGNESIUM SERPL-MCNC: 2.7 MG/DL (ref 1.8–2.4)
MCH RBC QN AUTO: 30.9 PG (ref 26.1–32.9)
MCH RBC QN AUTO: 31.4 PG (ref 26.1–32.9)
MCH RBC QN AUTO: 31.4 PG (ref 26.1–32.9)
MCHC RBC AUTO-ENTMCNC: 30.5 G/DL (ref 31.4–35)
MCHC RBC AUTO-ENTMCNC: 30.9 G/DL (ref 31.4–35)
MCHC RBC AUTO-ENTMCNC: 31.5 G/DL (ref 31.4–35)
MCV RBC AUTO: 100 FL (ref 79.6–97.8)
MCV RBC AUTO: 103 FL (ref 79.6–97.8)
MCV RBC AUTO: 99.7 FL (ref 79.6–97.8)
METAMYELOCYTES NFR BLD MANUAL: 1 %
MINUTES UNTIL NEXT BG, NBG: 60 MIN
MONOCYTES # BLD: 0.3 K/UL (ref 0.1–1.3)
MONOCYTES # BLD: 0.5 K/UL (ref 0.1–1.3)
MONOCYTES NFR BLD MANUAL: 2 % (ref 3–9)
MONOCYTES NFR BLD: 6 % (ref 4–12)
MUCOUS THREADS URNS QL MICRO: 0 /LPF
MULTIPLIER, MUL: 0.02
MULTIPLIER, MUL: 0.03
MULTIPLIER, MUL: 0.04
MULTIPLIER, MUL: 0.05
MYELOCYTES NFR BLD MANUAL: 1 %
NEUTS BAND NFR BLD MANUAL: 2 % (ref 0–10)
NEUTS SEG # BLD: 14.8 K/UL (ref 1.7–8.2)
NEUTS SEG # BLD: 2.2 K/UL (ref 1.7–8.2)
NEUTS SEG NFR BLD MANUAL: 57 % (ref 47–75)
NEUTS SEG NFR BLD: 49 % (ref 43–78)
NITRITE UR QL STRIP.AUTO: POSITIVE
NITRITE UR QL STRIP.AUTO: POSITIVE
NRBC # BLD: 0.03 K/UL (ref 0–0.2)
NRBC # BLD: 0.09 K/UL (ref 0–0.2)
NRBC # BLD: 0.1 K/UL (ref 0–0.2)
NRBC BLD-RTO: 3 PER 100 WBC
O2/TOTAL GAS SETTING VFR VENT: 100 %
ORDER INITIALS, ORDINIT: NORMAL
OTHER OBSERVATIONS,UCOM: ABNORMAL
OTHER OBSERVATIONS,UCOM: ABNORMAL
PCO2 BLD: 40.9 MMHG (ref 35–45)
PCO2 BLD: 42.6 MMHG (ref 35–45)
PCO2 BLD: 47 MMHG (ref 35–45)
PCO2 BLD: 54.8 MMHG (ref 35–45)
PCO2 BLDCO: 54 MMHG (ref 32–68)
PCO2 BLDCO: 63 MMHG (ref 32–68)
PEEP RESPIRATORY: 10 CMH2O
PEEP RESPIRATORY: 8 CMH2O
PH BLD: 7.05 [PH] (ref 7.35–7.45)
PH BLD: 7.11 [PH] (ref 7.35–7.45)
PH BLD: 7.16 [PH] (ref 7.35–7.45)
PH BLD: 7.19 [PH] (ref 7.35–7.45)
PH BLDCO: 7.12 [PH] (ref 7.15–7.38)
PH BLDCO: 7.15 [PH] (ref 7.15–7.38)
PH UR STRIP: 5.5 [PH] (ref 5–9)
PH UR STRIP: 7 [PH] (ref 5–9)
PHOSPHATE SERPL-MCNC: 3.5 MG/DL (ref 2.3–3.7)
PHOSPHATE SERPL-MCNC: 4.4 MG/DL (ref 2.3–3.7)
PLATELET # BLD AUTO: 53 K/UL (ref 150–450)
PLATELET # BLD AUTO: 69 K/UL (ref 150–450)
PLATELET # BLD AUTO: 86 K/UL (ref 150–450)
PLATELET COMMENTS,PCOM: ABNORMAL
PLATELET COMMENTS,PCOM: ABNORMAL
PMV BLD AUTO: 12 FL (ref 9.4–12.3)
PMV BLD AUTO: 12 FL (ref 9.4–12.3)
PMV BLD AUTO: 12.1 FL (ref 9.4–12.3)
PO2 BLD: 203 MMHG (ref 75–100)
PO2 BLD: 63 MMHG (ref 75–100)
PO2 BLD: 63 MMHG (ref 75–100)
PO2 BLD: 70 MMHG (ref 75–100)
PO2 BLDCO: 35 MMHG
PO2 BLDCO: 40 MMHG
POTASSIUM BLD-SCNC: 3.6 MMOL/L (ref 3.5–5.1)
POTASSIUM SERPL-SCNC: 2.5 MMOL/L (ref 3.5–5.1)
POTASSIUM SERPL-SCNC: 3.5 MMOL/L (ref 3.5–5.1)
POTASSIUM SERPL-SCNC: 3.6 MMOL/L (ref 3.5–5.1)
POTASSIUM SERPL-SCNC: 4 MMOL/L (ref 3.5–5.1)
PRESSURE CONTROL, IPC: 28
PRESSURE CONTROL, IPC: 28
PROCALCITONIN SERPL-MCNC: 143.98 NG/ML
PROT SERPL-MCNC: 4.4 G/DL (ref 6.3–8.2)
PROT SERPL-MCNC: 5.7 G/DL (ref 6.3–8.2)
PROT SERPL-MCNC: 6.2 G/DL (ref 6.3–8.2)
PROT UR STRIP-MCNC: 100 MG/DL
PROT UR STRIP-MCNC: >300 MG/DL
PROTHROMBIN TIME: 36.3 SEC (ref 11.7–14.5)
PROTHROMBIN TIME: 41.1 SEC (ref 11.7–14.5)
Q-T INTERVAL, ECG07: 390 MS
QRS DURATION, ECG06: 112 MS
QTC CALCULATION (BEZET), ECG08: 487 MS
RBC # BLD AUTO: 3.37 M/UL (ref 4.05–5.2)
RBC # BLD AUTO: 3.79 M/UL (ref 4.05–5.2)
RBC # BLD AUTO: 4.27 M/UL (ref 4.05–5.2)
RBC #/AREA URNS HPF: >100 /HPF
RBC #/AREA URNS HPF: ABNORMAL /HPF
RBC MORPH BLD: ABNORMAL
SAO2 % BLD: 64 % (ref 95–98)
SAO2 % BLD: 72 % (ref 95–98)
SAO2 % BLD: 84 % (ref 95–98)
SAO2 % BLD: 86 % (ref 95–98)
SAO2 % BLD: 87 % (ref 95–98)
SAO2 % BLD: 99 % (ref 95–98)
SERVICE CMNT-IMP: ABNORMAL
SODIUM BLD-SCNC: 139 MMOL/L (ref 136–145)
SODIUM SERPL-SCNC: 137 MMOL/L (ref 136–145)
SODIUM SERPL-SCNC: 141 MMOL/L (ref 136–145)
SODIUM SERPL-SCNC: 143 MMOL/L (ref 136–145)
SODIUM SERPL-SCNC: 145 MMOL/L (ref 136–145)
SP GR UR REFRACTOMETRY: 1.01 (ref 1–1.02)
SP GR UR REFRACTOMETRY: 1.02 (ref 1–1.02)
SPECIMEN TYPE: ABNORMAL
T4 FREE SERPL-MCNC: 1.1 NG/DL (ref 0.9–1.8)
TROPONIN I SERPL-MCNC: 1.23 NG/ML (ref 0.02–0.05)
TROPONIN I SERPL-MCNC: 1.27 NG/ML (ref 0.02–0.05)
TROPONIN I SERPL-MCNC: 1.54 NG/ML (ref 0.02–0.05)
TSH SERPL DL<=0.005 MIU/L-ACNC: 0.99 UIU/ML (ref 0.36–3.74)
UROBILINOGEN UR QL STRIP.AUTO: 0.2 EU/DL (ref 0.2–1)
UROBILINOGEN UR QL STRIP.AUTO: 2 EU/DL (ref 0.2–1)
VENTILATION MODE VENT: ABNORMAL
VENTRICULAR RATE, ECG03: 94 BPM
VT SETTING VENT: 500 ML
VT SETTING VENT: 550 ML
VT SETTING VENT: 550 ML
WBC # BLD AUTO: 18 K/UL (ref 4.3–11.1)
WBC # BLD AUTO: 24.3 K/UL (ref 4.3–11.1)
WBC # BLD AUTO: 4.6 K/UL (ref 4.3–11.1)
WBC MORPH BLD: ABNORMAL
WBC MORPH BLD: ABNORMAL
WBC URNS QL MICRO: >100 /HPF
WBC URNS QL MICRO: >100 /HPF

## 2020-01-01 PROCEDURE — 83605 ASSAY OF LACTIC ACID: CPT

## 2020-01-01 PROCEDURE — 74011250636 HC RX REV CODE- 250/636: Performed by: NURSE PRACTITIONER

## 2020-01-01 PROCEDURE — 84484 ASSAY OF TROPONIN QUANT: CPT

## 2020-01-01 PROCEDURE — 03HY32Z INSERTION OF MONITORING DEVICE INTO UPPER ARTERY, PERCUTANEOUS APPROACH: ICD-10-PCS | Performed by: INTERNAL MEDICINE

## 2020-01-01 PROCEDURE — 74011000250 HC RX REV CODE- 250

## 2020-01-01 PROCEDURE — 0BH17EZ INSERTION OF ENDOTRACHEAL AIRWAY INTO TRACHEA, VIA NATURAL OR ARTIFICIAL OPENING: ICD-10-PCS | Performed by: EMERGENCY MEDICINE

## 2020-01-01 PROCEDURE — 82550 ASSAY OF CK (CPK): CPT

## 2020-01-01 PROCEDURE — 84100 ASSAY OF PHOSPHORUS: CPT

## 2020-01-01 PROCEDURE — 36620 INSERTION CATHETER ARTERY: CPT | Performed by: INTERNAL MEDICINE

## 2020-01-01 PROCEDURE — 74011000258 HC RX REV CODE- 258: Performed by: NURSE PRACTITIONER

## 2020-01-01 PROCEDURE — 96376 TX/PRO/DX INJ SAME DRUG ADON: CPT

## 2020-01-01 PROCEDURE — 83735 ASSAY OF MAGNESIUM: CPT

## 2020-01-01 PROCEDURE — 96375 TX/PRO/DX INJ NEW DRUG ADDON: CPT

## 2020-01-01 PROCEDURE — 85730 THROMBOPLASTIN TIME PARTIAL: CPT

## 2020-01-01 PROCEDURE — 85025 COMPLETE CBC W/AUTO DIFF WBC: CPT

## 2020-01-01 PROCEDURE — 74011250637 HC RX REV CODE- 250/637: Performed by: INTERNAL MEDICINE

## 2020-01-01 PROCEDURE — 81015 MICROSCOPIC EXAM OF URINE: CPT

## 2020-01-01 PROCEDURE — 92950 HEART/LUNG RESUSCITATION CPR: CPT | Performed by: INTERNAL MEDICINE

## 2020-01-01 PROCEDURE — 71045 X-RAY EXAM CHEST 1 VIEW: CPT

## 2020-01-01 PROCEDURE — 74011250636 HC RX REV CODE- 250/636: Performed by: INTERNAL MEDICINE

## 2020-01-01 PROCEDURE — 36556 INSERT NON-TUNNEL CV CATH: CPT

## 2020-01-01 PROCEDURE — 77030005513 HC CATH URETH FOL11 MDII -B

## 2020-01-01 PROCEDURE — 85027 COMPLETE CBC AUTOMATED: CPT

## 2020-01-01 PROCEDURE — 83036 HEMOGLOBIN GLYCOSYLATED A1C: CPT

## 2020-01-01 PROCEDURE — 36600 WITHDRAWAL OF ARTERIAL BLOOD: CPT

## 2020-01-01 PROCEDURE — 99291 CRITICAL CARE FIRST HOUR: CPT

## 2020-01-01 PROCEDURE — 93005 ELECTROCARDIOGRAM TRACING: CPT | Performed by: EMERGENCY MEDICINE

## 2020-01-01 PROCEDURE — 82947 ASSAY GLUCOSE BLOOD QUANT: CPT

## 2020-01-01 PROCEDURE — 87086 URINE CULTURE/COLONY COUNT: CPT

## 2020-01-01 PROCEDURE — 94003 VENT MGMT INPAT SUBQ DAY: CPT

## 2020-01-01 PROCEDURE — 05HY33Z INSERTION OF INFUSION DEVICE INTO UPPER VEIN, PERCUTANEOUS APPROACH: ICD-10-PCS | Performed by: INTERNAL MEDICINE

## 2020-01-01 PROCEDURE — 99285 EMERGENCY DEPT VISIT HI MDM: CPT

## 2020-01-01 PROCEDURE — 77030020263 HC SOL INJ SOD CL0.9% LFCR 1000ML

## 2020-01-01 PROCEDURE — 74011000258 HC RX REV CODE- 258: Performed by: EMERGENCY MEDICINE

## 2020-01-01 PROCEDURE — 80053 COMPREHEN METABOLIC PANEL: CPT

## 2020-01-01 PROCEDURE — 99239 HOSP IP/OBS DSCHRG MGMT >30: CPT | Performed by: INTERNAL MEDICINE

## 2020-01-01 PROCEDURE — 74011000250 HC RX REV CODE- 250: Performed by: EMERGENCY MEDICINE

## 2020-01-01 PROCEDURE — 74011000258 HC RX REV CODE- 258: Performed by: INTERNAL MEDICINE

## 2020-01-01 PROCEDURE — 94002 VENT MGMT INPAT INIT DAY: CPT

## 2020-01-01 PROCEDURE — 74011000250 HC RX REV CODE- 250: Performed by: INTERNAL MEDICINE

## 2020-01-01 PROCEDURE — 81003 URINALYSIS AUTO W/O SCOPE: CPT

## 2020-01-01 PROCEDURE — 36556 INSERT NON-TUNNEL CV CATH: CPT | Performed by: INTERNAL MEDICINE

## 2020-01-01 PROCEDURE — 82803 BLOOD GASES ANY COMBINATION: CPT

## 2020-01-01 PROCEDURE — 84145 PROCALCITONIN (PCT): CPT

## 2020-01-01 PROCEDURE — 82962 GLUCOSE BLOOD TEST: CPT

## 2020-01-01 PROCEDURE — 75810000304 HC PLACE NEED INTRAOSSEOUS INFUS

## 2020-01-01 PROCEDURE — 31500 INSERT EMERGENCY AIRWAY: CPT | Performed by: INTERNAL MEDICINE

## 2020-01-01 PROCEDURE — 77030031644 HC PAD COOL ARTICGEL DISP BARD -D

## 2020-01-01 PROCEDURE — 77030040361 HC SLV COMPR DVT MDII -B

## 2020-01-01 PROCEDURE — 87040 BLOOD CULTURE FOR BACTERIA: CPT

## 2020-01-01 PROCEDURE — 80048 BASIC METABOLIC PNL TOTAL CA: CPT

## 2020-01-01 PROCEDURE — 36620 INSERTION CATHETER ARTERY: CPT

## 2020-01-01 PROCEDURE — 36592 COLLECT BLOOD FROM PICC: CPT

## 2020-01-01 PROCEDURE — 99223 1ST HOSP IP/OBS HIGH 75: CPT | Performed by: INTERNAL MEDICINE

## 2020-01-01 PROCEDURE — C1788 PORT, INDWELLING, IMP: HCPCS

## 2020-01-01 PROCEDURE — 74018 RADEX ABDOMEN 1 VIEW: CPT

## 2020-01-01 PROCEDURE — 81001 URINALYSIS AUTO W/SCOPE: CPT

## 2020-01-01 PROCEDURE — 84443 ASSAY THYROID STIM HORMONE: CPT

## 2020-01-01 PROCEDURE — 96365 THER/PROPH/DIAG IV INF INIT: CPT

## 2020-01-01 PROCEDURE — 85610 PROTHROMBIN TIME: CPT

## 2020-01-01 PROCEDURE — 65610000001 HC ROOM ICU GENERAL

## 2020-01-01 PROCEDURE — 84439 ASSAY OF FREE THYROXINE: CPT

## 2020-01-01 PROCEDURE — 74011636637 HC RX REV CODE- 636/637: Performed by: INTERNAL MEDICINE

## 2020-01-01 PROCEDURE — 31500 INSERT EMERGENCY AIRWAY: CPT

## 2020-01-01 PROCEDURE — 96360 HYDRATION IV INFUSION INIT: CPT

## 2020-01-01 PROCEDURE — 5A1935Z RESPIRATORY VENTILATION, LESS THAN 24 CONSECUTIVE HOURS: ICD-10-PCS | Performed by: EMERGENCY MEDICINE

## 2020-01-01 PROCEDURE — 77030040059 HC PAD COOLING ARCTICGEL BARD -G

## 2020-01-01 PROCEDURE — 80076 HEPATIC FUNCTION PANEL: CPT

## 2020-01-01 PROCEDURE — 74011250636 HC RX REV CODE- 250/636: Performed by: EMERGENCY MEDICINE

## 2020-01-01 RX ORDER — SODIUM CHLORIDE 0.9 % (FLUSH) 0.9 %
5-40 SYRINGE (ML) INJECTION AS NEEDED
Status: DISCONTINUED | OUTPATIENT
Start: 2020-01-01 | End: 2020-01-01 | Stop reason: HOSPADM

## 2020-01-01 RX ORDER — EPINEPHRINE 1 MG/ML
INJECTION, SOLUTION, CONCENTRATE INTRAVENOUS
Status: ACTIVE
Start: 2020-01-01 | End: 2020-01-01

## 2020-01-01 RX ORDER — NOREPINEPHRINE BITARTRATE/D5W 4MG/250ML
PLASTIC BAG, INJECTION (ML) INTRAVENOUS
Status: COMPLETED
Start: 2020-01-01 | End: 2020-01-01

## 2020-01-01 RX ORDER — POTASSIUM CHLORIDE 29.8 MG/ML
40 INJECTION INTRAVENOUS
Status: COMPLETED | OUTPATIENT
Start: 2020-01-01 | End: 2020-01-01

## 2020-01-01 RX ORDER — EPINEPHRINE 0.1 MG/ML
INJECTION INTRACARDIAC; INTRAVENOUS
Status: COMPLETED | OUTPATIENT
Start: 2020-01-01 | End: 2020-01-01

## 2020-01-01 RX ORDER — FENTANYL CITRATE 50 UG/ML
50 INJECTION, SOLUTION INTRAMUSCULAR; INTRAVENOUS ONCE
Status: COMPLETED | OUTPATIENT
Start: 2020-01-01 | End: 2020-01-01

## 2020-01-01 RX ORDER — SODIUM BICARBONATE 84 MG/ML
INJECTION, SOLUTION INTRAVENOUS
Status: COMPLETED
Start: 2020-01-01 | End: 2020-01-01

## 2020-01-01 RX ORDER — SODIUM BICARBONATE 84 MG/ML
50 INJECTION, SOLUTION INTRAVENOUS ONCE
Status: COMPLETED | OUTPATIENT
Start: 2020-01-01 | End: 2020-01-01

## 2020-01-01 RX ORDER — MIDAZOLAM HYDROCHLORIDE 1 MG/ML
1 INJECTION, SOLUTION INTRAMUSCULAR; INTRAVENOUS ONCE
Status: COMPLETED | OUTPATIENT
Start: 2020-01-01 | End: 2020-01-01

## 2020-01-01 RX ORDER — SODIUM CHLORIDE 9 MG/ML
8 INJECTION, SOLUTION INTRAVENOUS
Status: DISCONTINUED | OUTPATIENT
Start: 2020-01-01 | End: 2020-01-01 | Stop reason: HOSPADM

## 2020-01-01 RX ORDER — NOREPINEPHRINE BITARTRATE/D5W 4MG/250ML
.5-3 PLASTIC BAG, INJECTION (ML) INTRAVENOUS
Status: DISCONTINUED | OUTPATIENT
Start: 2020-01-01 | End: 2020-01-01 | Stop reason: HOSPADM

## 2020-01-01 RX ORDER — SODIUM BICARBONATE 1 MEQ/ML
50 SYRINGE (ML) INTRAVENOUS ONCE
Status: DISCONTINUED | OUTPATIENT
Start: 2020-01-01 | End: 2020-01-01 | Stop reason: SDUPTHER

## 2020-01-01 RX ORDER — SODIUM BICARBONATE 1 MEQ/ML
SYRINGE (ML) INTRAVENOUS
Status: COMPLETED | OUTPATIENT
Start: 2020-01-01 | End: 2020-01-01

## 2020-01-01 RX ORDER — FENTANYL CITRATE-0.9 % NACL/PF 25 MCG/ML
0-200 PLASTIC BAG, INJECTION (ML) INJECTION
Status: DISCONTINUED | OUTPATIENT
Start: 2020-01-01 | End: 2020-01-01 | Stop reason: HOSPADM

## 2020-01-01 RX ORDER — MIDAZOLAM HYDROCHLORIDE 1 MG/ML
INJECTION, SOLUTION INTRAMUSCULAR; INTRAVENOUS
Status: DISCONTINUED
Start: 2020-01-01 | End: 2020-01-01

## 2020-01-01 RX ORDER — ATRACURIUM BESYLATE 10 MG/ML
0.5 INJECTION, SOLUTION INTRAVENOUS ONCE
Status: COMPLETED | OUTPATIENT
Start: 2020-01-01 | End: 2020-01-01

## 2020-01-01 RX ORDER — POTASSIUM CHLORIDE 29.8 MG/ML
40 INJECTION INTRAVENOUS ONCE
Status: COMPLETED | OUTPATIENT
Start: 2020-01-01 | End: 2020-01-01

## 2020-01-01 RX ORDER — FENTANYL CITRATE 50 UG/ML
INJECTION, SOLUTION INTRAMUSCULAR; INTRAVENOUS
Status: DISCONTINUED
Start: 2020-01-01 | End: 2020-01-01

## 2020-01-01 RX ORDER — VANCOMYCIN HYDROCHLORIDE 1 G/20ML
INJECTION, POWDER, LYOPHILIZED, FOR SOLUTION INTRAVENOUS EVERY 12 HOURS
Status: DISCONTINUED | OUTPATIENT
Start: 2020-01-01 | End: 2020-01-01 | Stop reason: CLARIF

## 2020-01-01 RX ORDER — DEXTROSE 50 % IN WATER (D50W) INTRAVENOUS SYRINGE
25-50 AS NEEDED
Status: DISCONTINUED | OUTPATIENT
Start: 2020-01-01 | End: 2020-01-01 | Stop reason: HOSPADM

## 2020-01-01 RX ORDER — MAGNESIUM SULFATE HEPTAHYDRATE 40 MG/ML
2 INJECTION, SOLUTION INTRAVENOUS ONCE
Status: COMPLETED | OUTPATIENT
Start: 2020-01-01 | End: 2020-01-01

## 2020-01-01 RX ORDER — METRONIDAZOLE 500 MG/100ML
500 INJECTION, SOLUTION INTRAVENOUS
Status: COMPLETED | OUTPATIENT
Start: 2020-01-01 | End: 2020-01-01

## 2020-01-01 RX ORDER — SODIUM CHLORIDE 9 MG/ML
100 INJECTION, SOLUTION INTRAVENOUS CONTINUOUS
Status: DISCONTINUED | OUTPATIENT
Start: 2020-01-01 | End: 2020-01-01 | Stop reason: HOSPADM

## 2020-01-01 RX ORDER — SODIUM CHLORIDE 0.9 % (FLUSH) 0.9 %
5-40 SYRINGE (ML) INJECTION EVERY 8 HOURS
Status: DISCONTINUED | OUTPATIENT
Start: 2020-01-01 | End: 2020-01-01 | Stop reason: HOSPADM

## 2020-01-01 RX ORDER — METRONIDAZOLE 500 MG/100ML
500 INJECTION, SOLUTION INTRAVENOUS EVERY 12 HOURS
Status: DISCONTINUED | OUTPATIENT
Start: 2020-01-01 | End: 2020-01-01 | Stop reason: HOSPADM

## 2020-01-01 RX ORDER — DEXTROSE 40 %
15 GEL (GRAM) ORAL AS NEEDED
Status: DISCONTINUED | OUTPATIENT
Start: 2020-01-01 | End: 2020-01-01 | Stop reason: HOSPADM

## 2020-01-01 RX ADMIN — VASOPRESSIN 0.04 UNITS/MIN: 20 INJECTION INTRAVENOUS at 15:13

## 2020-01-01 RX ADMIN — METRONIDAZOLE 500 MG: 500 INJECTION, SOLUTION INTRAVENOUS at 16:22

## 2020-01-01 RX ADMIN — SODIUM CHLORIDE 1000 ML: 900 INJECTION, SOLUTION INTRAVENOUS at 21:53

## 2020-01-01 RX ADMIN — SODIUM BICARBONATE 50 MEQ: 84 INJECTION, SOLUTION INTRAVENOUS at 14:12

## 2020-01-01 RX ADMIN — FENTANYL CITRATE 50 MCG: 50 INJECTION, SOLUTION INTRAMUSCULAR; INTRAVENOUS at 16:52

## 2020-01-01 RX ADMIN — MINERAL OIL AND WHITE PETROLATUM: 150; 830 OINTMENT OPHTHALMIC at 17:15

## 2020-01-01 RX ADMIN — Medication 30 MCG/MIN: at 04:41

## 2020-01-01 RX ADMIN — METRONIDAZOLE 500 MG: 500 INJECTION, SOLUTION INTRAVENOUS at 04:41

## 2020-01-01 RX ADMIN — POTASSIUM CHLORIDE 40 MEQ: 400 INJECTION, SOLUTION INTRAVENOUS at 00:05

## 2020-01-01 RX ADMIN — SODIUM BICARBONATE 50 MEQ: 84 INJECTION, SOLUTION INTRAVENOUS at 13:20

## 2020-01-01 RX ADMIN — SODIUM CHLORIDE 1000 ML: 900 INJECTION, SOLUTION INTRAVENOUS at 18:35

## 2020-01-01 RX ADMIN — SODIUM CHLORIDE 5 MCG/KG/MIN: 900 INJECTION, SOLUTION INTRAVENOUS at 17:02

## 2020-01-01 RX ADMIN — MIDAZOLAM 2 MG/HR: 5 INJECTION INTRAMUSCULAR; INTRAVENOUS at 17:05

## 2020-01-01 RX ADMIN — SODIUM BICARBONATE 50 MEQ: 84 INJECTION, SOLUTION INTRAVENOUS at 18:28

## 2020-01-01 RX ADMIN — SODIUM BICARBONATE 50 MEQ: 84 INJECTION, SOLUTION INTRAVENOUS at 13:35

## 2020-01-01 RX ADMIN — SODIUM CHLORIDE 3.5 UNITS/HR: 900 INJECTION, SOLUTION INTRAVENOUS at 19:00

## 2020-01-01 RX ADMIN — SODIUM BICARBONATE 50 MEQ: 84 INJECTION, SOLUTION INTRAVENOUS at 14:58

## 2020-01-01 RX ADMIN — SODIUM CHLORIDE 100 ML/HR: 900 INJECTION, SOLUTION INTRAVENOUS at 21:02

## 2020-01-01 RX ADMIN — Medication 4 MCG/MIN: at 13:55

## 2020-01-01 RX ADMIN — EPINEPHRINE 6 MCG/MIN: 1 INJECTION PARENTERAL at 21:59

## 2020-01-01 RX ADMIN — EPINEPHRINE 1 MG: 0.1 INJECTION, SOLUTION ENDOTRACHEAL; INTRACARDIAC; INTRAVENOUS at 13:35

## 2020-01-01 RX ADMIN — SODIUM CHLORIDE 1000 ML: 900 INJECTION, SOLUTION INTRAVENOUS at 19:49

## 2020-01-01 RX ADMIN — ATRACURIUM BESYLATE 68.1 MG: 10 INJECTION, SOLUTION INTRAVENOUS at 16:57

## 2020-01-01 RX ADMIN — MAGNESIUM SULFATE HEPTAHYDRATE 2 G: 40 INJECTION, SOLUTION INTRAVENOUS at 00:12

## 2020-01-01 RX ADMIN — WATER 18.28 NG/KG/MIN: 1 SOLUTION INTRAVENOUS at 21:47

## 2020-01-01 RX ADMIN — Medication 30 MCG/MIN: at 19:25

## 2020-01-01 RX ADMIN — CEFEPIME HYDROCHLORIDE 2 G: 2 INJECTION, POWDER, FOR SOLUTION INTRAVENOUS at 16:40

## 2020-01-01 RX ADMIN — MINERAL OIL AND WHITE PETROLATUM: 150; 830 OINTMENT OPHTHALMIC at 20:23

## 2020-01-01 RX ADMIN — SODIUM BICARBONATE 50 MEQ: 84 INJECTION, SOLUTION INTRAVENOUS at 14:09

## 2020-01-01 RX ADMIN — Medication 30 MCG/MIN: at 02:37

## 2020-01-01 RX ADMIN — Medication 10 ML: at 04:43

## 2020-01-01 RX ADMIN — MIDAZOLAM 1 MG: 1 INJECTION INTRAMUSCULAR; INTRAVENOUS at 16:52

## 2020-01-01 RX ADMIN — WATER 45.7 NG/KG/MIN: 1 SOLUTION INTRAVENOUS at 03:59

## 2020-01-01 RX ADMIN — EPINEPHRINE 1 MG: 0.1 INJECTION, SOLUTION ENDOTRACHEAL; INTRACARDIAC; INTRAVENOUS at 14:07

## 2020-01-01 RX ADMIN — MINERAL OIL AND WHITE PETROLATUM: 150; 830 OINTMENT OPHTHALMIC at 00:07

## 2020-01-01 RX ADMIN — EPINEPHRINE 1 MG: 0.1 INJECTION, SOLUTION ENDOTRACHEAL; INTRACARDIAC; INTRAVENOUS at 13:38

## 2020-01-01 RX ADMIN — Medication 10 ML: at 20:22

## 2020-01-01 RX ADMIN — Medication 30 MCG/MIN: at 16:49

## 2020-01-01 RX ADMIN — CALCIUM GLUCONATE 1 G: 98 INJECTION, SOLUTION INTRAVENOUS at 00:05

## 2020-01-01 RX ADMIN — POTASSIUM CHLORIDE 40 MEQ: 400 INJECTION, SOLUTION INTRAVENOUS at 03:31

## 2020-01-01 RX ADMIN — EPINEPHRINE 5 MCG/MIN: 1 INJECTION PARENTERAL at 14:18

## 2020-01-01 RX ADMIN — FAMOTIDINE 20 MG: 10 INJECTION INTRAVENOUS at 20:41

## 2020-01-01 RX ADMIN — SODIUM CHLORIDE 1000 ML: 900 INJECTION, SOLUTION INTRAVENOUS at 17:04

## 2020-01-01 RX ADMIN — VANCOMYCIN HYDROCHLORIDE 2500 MG: 10 INJECTION, POWDER, LYOPHILIZED, FOR SOLUTION INTRAVENOUS at 15:51

## 2020-01-01 RX ADMIN — EPINEPHRINE 1 MG: 0.1 INJECTION, SOLUTION ENDOTRACHEAL; INTRACARDIAC; INTRAVENOUS at 14:10

## 2020-01-01 RX ADMIN — SODIUM CHLORIDE 100 ML/HR: 900 INJECTION, SOLUTION INTRAVENOUS at 13:03

## 2020-01-01 RX ADMIN — Medication 28 MCG/MIN: at 00:21

## 2020-01-01 RX ADMIN — MINERAL OIL AND WHITE PETROLATUM: 150; 830 OINTMENT OPHTHALMIC at 03:33

## 2020-01-01 RX ADMIN — Medication 28 MCG/MIN: at 21:51

## 2020-01-01 RX ADMIN — SODIUM CHLORIDE 6 ML/HR: 900 INJECTION, SOLUTION INTRAVENOUS at 21:46

## 2020-01-01 RX ADMIN — Medication 50 MCG/HR: at 17:38

## 2020-01-22 PROBLEM — A41.9 SEPSIS (HCC): Status: ACTIVE | Noted: 2020-01-01

## 2020-01-22 PROBLEM — I95.9 HYPOTENSION: Status: ACTIVE | Noted: 2020-01-01

## 2020-01-22 PROBLEM — I46.9 CARDIAC ARREST (HCC): Status: ACTIVE | Noted: 2020-01-01

## 2020-01-22 PROBLEM — N39.0 UTI (URINARY TRACT INFECTION): Status: ACTIVE | Noted: 2020-01-01

## 2020-01-22 PROBLEM — D69.6 THROMBOCYTOPENIA (HCC): Status: ACTIVE | Noted: 2020-01-01

## 2020-01-22 PROBLEM — J96.00 ACUTE RESPIRATORY FAILURE (HCC): Status: ACTIVE | Noted: 2020-01-01

## 2020-01-22 PROBLEM — N17.9 ACUTE RENAL FAILURE (ARF) (HCC): Status: ACTIVE | Noted: 2020-01-01

## 2020-01-22 PROBLEM — E66.01 OBESITY, MORBID (HCC): Chronic | Status: ACTIVE | Noted: 2018-04-03

## 2020-01-22 NOTE — PROGRESS NOTES
Patient arrived to ICU and placed on monitors and vent. Patient on levophed gtt, epi gtt, and vasopressin gtt. Dr. Miki Corley at bedside placement CVC and arterial line. Dual skin assessment performed with Serena Gee. Patient's skin is intact with no signs of breakdown. Patient's skin appears mottled and pale.

## 2020-01-22 NOTE — ED TRIAGE NOTES
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 EMS reports CPR was started by office staff. States seizure-like activity prior to arrest.  Patient was found to be in asystole and then PEA and then V-tach. Defibrillation x 1 was done with EMS. EMS got ROSC with sinus tach rate of 160. Patient given epi x 2 and amiodarone 300 mg en route. Patient arrives with I-Gel in place for airway.

## 2020-01-22 NOTE — PROGRESS NOTES
Patient with another cardiac arrest with asystole. Had to do CPR with Epi/bicarb and about 5 minutes of CPR with return of pressure. Started on EPI drip in additional to levophed. Will place arterial line and central line in ICU when moving there. Spoke with family few times and aware of acuity. Aware continued CPR with increased risk for poor mental status, if she recovers given then is 3 CPR arrest now Terrell Arceo MD

## 2020-01-22 NOTE — PROGRESS NOTES
Spoke with Yesika Carrillo, charge nurse about patients repeat lactic that was due at 1700. Last result was 13.0. She states that she will address it.

## 2020-01-22 NOTE — PROGRESS NOTES
MD made aware of patient's INR. MD to come speak with family. Insulin gtt to be started due to patient being on epi gtt.

## 2020-01-22 NOTE — PROGRESS NOTES
Lines both arterial and venous just place. On 3 pressors now -- Epi (max), Levo (max at 3) and Vaso at 0.03 with MAP of 65-68 on arterial line. AFIB at about 100. Saturation at 94% on 100% Vent. Getting setup for artic sun now. Gave family update Condition is critical 
 
 
Roney Ireland MD

## 2020-01-22 NOTE — PROCEDURES
Indication: 
 
Time out done and correct patient identified and correct incision/insertion site identified. Everyone Agrees For procedure sterile techniques used including: Sterile gown, gloves, cap, mask, drapes and chlorhexidine to the insertions site/location. Area prepped and sterilized with chlorhexedine. Sterile drapes applied. Patient given local lidocane for anesthesia. Using Seldinger Technique   QUAD  Lumen Catheter inserted. Guidewire removed. All ports with blood return and lines flushed. Line Sutured in Tasia Dearam Baker. Patient tolerated procedure well, no complications. Estimated Blood loss: 3 ml on blood retun. Ethan Bledsoe MD 
 
Electronically signed.

## 2020-01-22 NOTE — PROGRESS NOTES
Ventilator check complete; patient has a #7. 0 ET tube secured at the 23 at the lip. Patient is sedated. Patient is not able to follow commands. Breath sounds are diminished. Trachea is midline, Negative for subcutaneous air, and chest excursion is symmetric. Patient is also Positive for cyanosis (Fio2 100%) and is Negative for pitting edema. All alarms are set and audible. Resuscitation bag is at the head of the bed. Ventilator Settings Mode FIO2 Rate Tidal Volume Pressure PEEP I:E Ratio Peak airway pressure:    
Minute ventilation: ABG: No results for input(s): PH, PCO2, PO2, HCO3 in the last 72 hours. [place] Drafts

## 2020-01-22 NOTE — PROGRESS NOTES
Initial visit made to patient and a prayer was provided.  provided emotional support and assistance to the family members.  was requested to lead the family members to the ICU waiting room and inform the patient's nurse that they were now in the ICU waiting room. KORI Roman

## 2020-01-22 NOTE — PROGRESS NOTES
Repeat lactic acid is due at 1700-spoke with Deborah Worthy, charge nurse to please place repeat order. Last lactic acid was 13.0

## 2020-01-22 NOTE — PROGRESS NOTES
Pharmacokinetic Consult to Pharmacist 
 
Joao Holt is a 71 y.o. female being treated for sepsis with vancomcyin and cefepime. @YK[facility]K(10)@  @[facility](29)@ Lab Results Component Value Date/Time BUN 68 (H) 01/22/2020 12:53 PM  
 Creatinine 4.59 (H) 01/22/2020 12:53 PM  
 WBC 24.3 (H) 01/22/2020 12:53 PM  
 Procalcitonin 143.98 01/22/2020 01:00 PM  
 Lactic acid 13.0 (HH) 01/22/2020 12:55 PM  
  
Estimated Creatinine Clearance: 15.9 mL/min (A) (based on SCr of 4.59 mg/dL (H)). CULTURES: 
pending Day 1 of vancomycin. Goal trough is 15-20. Vancomycin dose initiated at 2500 mg x 1. Will dose patient intermittently due to severe ANGEL. Pharmacy will continue to follow patient and check levels when clinically indicated. Thank you, Pepper Solorio, Pharm. D. 
PGY1 Pharmacy Resident [phone]

## 2020-01-22 NOTE — ED NOTES
Pt intubated by Dr. Page Medrano with a 7.0 ett secured at 24 cm at the teeth. Bilateral breath sounds heard, +etco2 color change and CXR ordered.

## 2020-01-22 NOTE — H&P
oody 
             
 
 
History and Physical Note Maria A Hernandez 1/22/2020 Date of Admission:  1/22/2020 The patient's chart is reviewed and the patient is discussed with the staff. Subjective:  
 
Patient is a 71 y.o.  female seen and evaluated at the request of Dr. Ly Jarvis, ER physician. Had a witnessed arrest at urologist office today. Per chart she developed seizure activity and quit breathing. Resuscitation efforts initiated. EMS was called and required defib x1 with ROSC and sinus tach HR 160s. Was reported to have been in asystole then PEA then V-tach. She also received epi x2 and Amiodorone. Was reported to have been seen in today in follow up for pyelonephritis and recently placed on Cipro. Currently seen in the ER on vent support and being re-intubated by ER physician. Is unresponsive and has no corneal reflex on exam.  Was reported to have bee down for about 15 minutes. Barnes placed with thick chocolate colored urine. WBC 24, platelets 69, creat 0.90. While in the ER she suffered another arrest with ROSC but remains hypotensive, receiving IVFs and placing on pressor support. She vomited and OG placed with tan/bloody GI contents. Her roommate (her POA) and family were updated on all events. Unfortunately she suffered her 3rd arrest but quickly regained ROSC. Chronic medical:  Hypothyroidism, HLD, HTN, AFib (on Tambocor and Xarelto), nephrolithiasis, obesity, OA. Review of Systems Review of systems not obtained due to patient factors. Patient Active Problem List  
Diagnosis Code  Hypothyroidism E03.9  
 HTN (hypertension) I10  
 Hyperlipidemia E78.5  Back pain M54.9  Obesity, morbid ([address] E66.01  
 Paroxysmal atrial fibrillation (HCC) I48.0  Cardiac arrest ([address] I46.9  Acute respiratory failure (HCC) J96.00  
 Hypotension I95.9  Acute renal failure (ARF) (HCC) N17.9  
 UTI (urinary tract infection) N39.0  Sepsis ([address] A41.9  Thrombocytopenia ([address] D69.6 Home DME company unknown. Prior to Admission Medications Prescriptions Last Dose Informant Patient Reported? Taking? ASCORBIC ACID/COLLAGEN HYDR (COLLAGEN PLUS VITAMIN C PO)   Yes No  
Sig: Take  by mouth two (2) times a day. DISABLED PLACARD (DISABLED PLACARD) DMV   No No  
Sig: Needs handicap sticker . DJD  
DOCUSATE CALCIUM (STOOL SOFTENER PO)   Yes No  
Sig: Take  by mouth. GLUCOSAMINE HCL/CHONDR DAMON A NA (OSTEO BI-FLEX PO)   Yes No  
Sig: Take  by mouth two (2) times a day. Last dose 5/19/18 TURMERIC ROOT EXTRACT PO   Yes No  
Sig: Take  by mouth nightly. Last dose 5/20/18  
acetaminophen (TYLENOL) 325 mg tablet   Yes No  
Sig: Take  by mouth every four (4) hours as needed for Pain. ciprofloxacin HCl (CIPRO) 500 mg tablet   Yes No  
Sig: Take 500 mg by mouth. flecainide (TAMBOCOR) 50 mg tablet   No No  
Sig: TAKE 1 TABLET BY MOUTH TWICE DAILY  
levothyroxine (SYNTHROID) 175 mcg tablet   No No  
Sig: TAKE 1 TABLET BY MOUTH DAILY BEFORE BREAKFAST  
magnesium oxide 500 mg tab   Yes No  
Sig: Take 500 mg by mouth two (2) times a day. melatonin 5 mg cap capsule   Yes No  
Sig: Take 5 mg by mouth nightly. metoprolol succinate (TOPROL XL) 50 mg XL tablet   Yes No  
Sig: Take  by mouth daily. olmesartan-hydroCHLOROthiazide (BENICAR HCT) 40-12.5 mg per tablet   No No  
Sig: Take 1 Tab by mouth daily. oxyCODONE IR (ROXICODONE) 5 mg immediate release tablet   Yes No  
Sig: Take 5 mg by mouth.  
rivaroxaban (XARELTO) 20 mg tab tablet   No No  
Sig: Take 1 Tab by mouth daily (with dinner). Indications: Treatment to Prevent Blood Clots in Chronic Atrial Fibrillation  
rosuvastatin (CRESTOR) 20 mg tablet   No No  
Sig: Take 1 Tab by mouth nightly. Facility-Administered Medications: None Past Medical History:  
Diagnosis Date  Adverse effect of anesthesia   
 pt was told today (5/22/18) with her colonoscopy that she has JULEE  Atrial fibrillation ([facility] [address]   
 [place] [place] [facility]-   
 Back pain 3/7/2014  
 HTN (hypertension) 3/7/2014  Hypercholesterolemia  Hyperlipidemia 3/7/2014  Hypothyroidism 3/7/2014  Kidney stone Past Surgical History:  
Procedure Laterality Date  HX HEENT    
 septoplasty  HX HYSTERECTOMY    
 ARUNA  
 HX ORTHOPAEDIC Right   
 shoulder  HX ORTHOPAEDIC Left   
 scope knee Social History Socioeconomic History  Marital status:  Spouse name: Not on file  Number of children: Not on file  Years of education: Not on file  Highest education level: Not on file Occupational History  Not on file Social Needs  Financial resource strain: Not on file  Food insecurity:  
  Worry: Not on file Inability: Not on file  Transportation needs:  
  Medical: Not on file Non-medical: Not on file Tobacco Use  Smoking status: Never Smoker  Smokeless tobacco: Never Used Substance and Sexual Activity  Alcohol use: Yes Comment: occ  Drug use: No  
 Sexual activity: Not on file Lifestyle  Physical activity:  
  Days per week: Not on file Minutes per session: Not on file  Stress: Not on file Relationships  Social connections:  
  Talks on phone: Not on file Gets together: Not on file Attends Anabaptism service: Not on file Active member of club or organization: Not on file Attends meetings of clubs or organizations: Not on file Relationship status: Not on file  Intimate partner violence:  
  Fear of current or ex partner: Not on file Emotionally abused: Not on file Physically abused: Not on file Forced sexual activity: Not on file Other Topics Concern  Not on file Social History Narrative  Not on file Family History Problem Relation Age of Onset  Arthritis-rheumatoid Mother  Diabetes Father  Hypertension Father  Coronary Artery Disease Father 54 MI   
 No Known Problems Sister  No Known Problems Brother  No Known Problems Brother Allergies Allergen Reactions  Penicillins Anaphylaxis \"knots under skin and then SOB\"- \"I had it for 10 days before it happened\" Current Facility-Administered Medications Medication Dose Route Frequency  0.9% sodium chloride infusion  100 mL/hr IntraVENous CONTINUOUS  
 sodium bicarbonate (8.4%) 1 mEq/mL (8.4 %) injection  sodium bicarbonate (8.4%) injection 50 mEq  50 mEq IntraVENous ONCE Current Outpatient Medications Medication Sig  
 oxyCODONE IR (ROXICODONE) 5 mg immediate release tablet Take 5 mg by mouth.  ciprofloxacin HCl (CIPRO) 500 mg tablet Take 500 mg by mouth.  metoprolol succinate (TOPROL XL) 50 mg XL tablet Take  by mouth daily.  rivaroxaban (XARELTO) 20 mg tab tablet Take 1 Tab by mouth daily (with dinner). Indications: Treatment to Prevent Blood Clots in Chronic Atrial Fibrillation  rosuvastatin (CRESTOR) 20 mg tablet Take 1 Tab by mouth nightly.  olmesartan-hydroCHLOROthiazide (BENICAR HCT) 40-12.5 mg per tablet Take 1 Tab by mouth daily.  levothyroxine (SYNTHROID) 175 mcg tablet TAKE 1 TABLET BY MOUTH DAILY BEFORE BREAKFAST  DISABLED PLACARD (DISABLED PLACARD) DMV Needs handicap sticker . DJD  
 flecainide (TAMBOCOR) 50 mg tablet TAKE 1 TABLET BY MOUTH TWICE DAILY  TURMERIC ROOT EXTRACT PO Take  by mouth nightly. Last dose 5/20/18  melatonin 5 mg cap capsule Take 5 mg by mouth nightly.  acetaminophen (TYLENOL) 325 mg tablet Take  by mouth every four (4) hours as needed for Pain.  magnesium oxide 500 mg tab Take 500 mg by mouth two (2) times a day.  DOCUSATE CALCIUM (STOOL SOFTENER PO) Take  by mouth.  GLUCOSAMINE HCL/CHONDR DAMON A NA (OSTEO BI-FLEX PO) Take  by mouth two (2) times a day. Last dose 5/19/18  ASCORBIC ACID/COLLAGEN HYDR (COLLAGEN PLUS VITAMIN C PO) Take  by mouth two (2) times a day. Objective: There were no vitals filed for this visit. PHYSICAL EXAM  
 
Constitutional:  the patient is obese and in no acute distress, orally intubated and on vent support EENMT:  Sclera clear, pupils equal, oral mucosa moist, no corneal reflex Respiratory: bilateral crackles Cardiovascular:  RRR without M,G,R 
Gastrointestinal: soft, obese with hypoactive bowel sounds. Musculoskeletal: warm without cyanosis. There is trace bilateral LE edema. Skin:  no jaundice or rashes, no wounds Neurologic: unresponsive Psychiatric:  Unresponsive Lines: EET 1/22/20 PIV sites Barnes 1/22/20 Drips:     
Levophed CXR:   
1/22/20: 
 
 
Recent Labs  
  01/22/20 
1253 WBC 24.3* HGB 13.4 HCT 44.0  
PLT 69* Recent Labs  
  01/22/20 
1253   
K 4.0  
CL 99 * CO2 17*  
BUN 68* CREA 4.59* CA 9.5 ALB 2.2* TBILI PENDING  
ALT PENDING  
SGOT 163* Recent Labs  
  01/22/20 
1315 PHI 7.049* PCO2I 42.6 PO2I 203* HCO3I 11.8* No results for input(s): LCAD, LAC in the last 72 hours. Assessment:  (Medical Decision Making) Hospital Problems  Date Reviewed: 1/22/2020 Codes Class Noted POA * (Principal) Cardiac arrest [facility]) ICD-10-CM: I46.9 ICD-9-CM: 427.5  1/22/2020 Unknown Acute respiratory failure ([facility] [address] ICD-10-CM: J96.00 
ICD-9-CM: 518.81  1/22/2020 Yes Hypotension ICD-10-CM: I95.9 ICD-9-CM: 458.9  1/22/2020 Yes Acute renal failure (ARF) (HCC) ICD-10-CM: N17.9 ICD-9-CM: 584.9  1/22/2020 Yes  
   
 UTI (urinary tract infection) ICD-10-CM: N39.0 ICD-9-CM: 599.0  1/22/2020 Yes Sepsis ([facility] [address] ICD-10-CM: A41.9 ICD-9-CM: 038.9, 995.91  1/22/2020 Yes Thrombocytopenia ([facility] [address] ICD-10-CM: D69.6 ICD-9-CM: 287.5  1/22/2020 Yes Obesity, morbid ([facility] [address] (Chronic) ICD-10-CM: E66.01 
ICD-9-CM: 278.01  4/3/2018 Yes Plan:  (Medical Decision Making) --Admit to the ICU 
--[employer] protocol 
--Pressor support --Creat 4.5--continue IVF and will likely need nephrology --Will consult cardiology 
--Panculture --Antibiotics 
--Full code confirmed by Aisha Alvares, noyr-mate and POA. More than 50% of the time documented was spent in face-to-face contact with the patient and in the care of the patient on the floor/unit where the patient is located. Thank you very much for this referral.  We appreciate the opportunity to participate in this patient's care. Will follow along with above stated plan. Dion Bergeron, MED Lungs: coarse sounds Heart S1 and S2 audible, no murmers or rubs appreciated Other Aggressive care in ICU with artic sun now On double pressors 
abg now Spoke with family few times Condition is critical 
 
I have spoken with and examined the patient. I have reviewed the history, examination, assessment, and plan and agree with the above. Alejandro Lugo MD 
 
 
This note was signed electronically. Errors are unfortunately her likely due to dictation software.

## 2020-01-22 NOTE — PROCEDURES
Procedure:  mergent intubation Indication: cardiac arrest 
 
Time out done and correct patient identified. Anesthesia- Rapid sequence: not responsive post outpatient arrest 
 
Came to see patient and Dr. Josefina Dodson having hard time intubating patient. Noted sats dropping and offered to assist. Not able to pass size 8 ETT tube. Stopped since noted saturation falling and CPR was started. ACLS protol done and ROSC. Noted vomited few times and suctioned. Then intubated on 1st Attempt  After positive identification of the vocal chords, an 7.0 ETTT tube was placed into the trachea with direct visualization. The tube was seen passing through the vocal chords  without  difficulty. CO2 colorimetry was employed immediately to verify tube in airway  with   appropriate color change indicating detection  of CO2. Placed on lip line 23. Water vapor was seen within the ET tube, and auscultation of the abdomen revealed no bubbling souds. Auscultation  and inspection of the chest after intubation showed symmetric chest excursion and symmetric air entry bilaterally. Chest X-ray ordered and noted ETT in right main stem bronchus. Apparently was pushed down to 25 when I had to 23. Will pull up to 22 for now. The patient has been placed on a mechanical ventilator. There were no complications. Aminta Salinas MD 
 
This note was signed electronically.

## 2020-01-22 NOTE — PROGRESS NOTES
CXR noted and ETT in Right main stem bronchus and will pull up by 3 now to lip line 22 -- I told respiratory myself Gabriela Cordoba MD

## 2020-01-22 NOTE — CONSULTS
[facility] Cardiology Consult Date of  Admission: 1/22/2020 12:45 PM  
 
Primary Care Physician:  Dr. Donovan Bird Primary Cardiologist:  Dr. Elisa Malik Referring Physician:  Dr. Farhan Subramanian Consulting Physician:  Dr. Rain Schmidt CC/Reason for consult:  S/p cardiac arrest  
 
 
Katya Clemente is a 71 y.o. female with PMH of pAF (was on Xarelto), HTN, HLD, and renal stones, who presented to the ED from urology office after cardiac arrest.  The patient was having right flank pain and abdominal pain for 4 days prior to admission and was seen at  and was diagnosed with pyelonephritis and was treated with IM rocephin and cipro PO x 7 days with urgent urology follow up. Today she presented to urology today and reported feeling very weak. She was placed on table for a KUB and when the NP returned she was unresponsive but was still breathing per note, but rapidly became apneic and pulseless. CPR was initiated and EMS was summoned. On arrival of EMS she was asystole then PEA for which epi was given and ROSC was briefly achieved. It is reported that she then went into VT and defib x 1 and bolus of amio 300 mg given. ROSC was achieved. EMS placed I-Gel for airway. On arrival to the ED she was profoundly hypotensive with SBP in 60s. Barnes was placed with dark brown urine return noted. Labs showed plt 69, WBC 24, creatinine 4.39, INR 4.1, , LA 13, pH 7.0, pCO2 42, O2 203 and HCO3 11.8.  ronen was reintubated in the ED. While in the ER she arrested twice more with ROSC. She was given IVF per sepsis protocol and remained hypotensive. She was placed on levophed, epi, and vasopressin. Patient was transferred to ICU On exam she remains intubated and on triple pressors. Information obtained from chart. Patient Active Problem List  
Diagnosis Code  Hypothyroidism E03.9  
 HTN (hypertension) I10  
 Hyperlipidemia E78.5  Back pain M54.9  Obesity, morbid ([address] E66.01  
  Paroxysmal atrial fibrillation (HCC) I48.0  Cardiac arrest ([address] I46.9  Acute respiratory failure (HCC) J96.00  
 Hypotension I95.9  Acute renal failure (ARF) (HCC) N17.9  
 UTI (urinary tract infection) N39.0  Sepsis ([address] A41.9  Thrombocytopenia ([facility] [address] D69.6 Past Medical History:  
Diagnosis Date  Adverse effect of anesthesia   
 pt was told today (5/22/18) with her colonoscopy that she has JULEE  Atrial fibrillation ([facility] [address]   
 controlled with [facility] FU-   
 Back pain 3/7/2014  
 HTN (hypertension) 3/7/2014  Hypercholesterolemia  Hyperlipidemia 3/7/2014  Hypothyroidism 3/7/2014  Kidney stone Past Surgical History:  
Procedure Laterality Date  HX HEENT    
 septoplasty  HX HYSTERECTOMY    
 ARUNA  
 HX ORTHOPAEDIC Right   
 shoulder  HX ORTHOPAEDIC Left   
 scope knee Allergies Allergen Reactions  Penicillins Anaphylaxis \"knots under skin and then SOB\"- \"I had it for 10 days before it happened\" Family History Problem Relation Age of Onset  Arthritis-rheumatoid Mother  Diabetes Father  Hypertension Father  Coronary Artery Disease Father 54 MI   
 No Known Problems Sister  No Known Problems Brother  No Known Problems Brother Current Facility-Administered Medications Medication Dose Route Frequency  0.9% sodium chloride infusion  100 mL/hr IntraVENous CONTINUOUS  
 NOREPINephrine (LEVOPHED) 4 mg in 5% dextrose 250 mL infusion  0.5-30 mcg/min IntraVENous TITRATE  EPINEPHrine (ADRENALIN) 4 mg in 0.9% sodium chloride 250 mL infusion  1-10 mcg/min IntraVENous TITRATE  sodium chloride (NS) flush 5-40 mL  5-40 mL IntraVENous Q8H  
 sodium chloride (NS) flush 5-40 mL  5-40 mL IntraVENous PRN  
 [START ON 1/23/2020] metroNIDAZOLE (FLAGYL) IVPB premix 500 mg  500 mg IntraVENous Q12H  
 vasopressin (VASOSTRICT) 20 Units in 0.9% sodium chloride 100 mL infusion  0-0.1 Units/min IntraVENous TITRATE  Vancomycin Intermittent Dosing Plaecholder   Other Rx Dosing/Monitoring  atracurium (TRACRIUM) 1,000 mg in 0.9% sodium chloride 250 mL infusion  0-15 mcg/kg/min IntraVENous TITRATE  white petrolatum-mineral oil (AKWA TEARS) 83-15 % ophthalmic ointment   Both Eyes Q4H  
 fentaNYL in normal saline (pf) 25 mcg/mL infusion  0-200 mcg/hr IntraVENous TITRATE  midazolam (VERSED) 1 mg/mL injection  fentaNYL citrate (PF) 50 mcg/mL injection  midazolam (VERSED) 100 mg in 0.9% sodium chloride 100 mL infusion  0-10 mg/hr IntraVENous TITRATE  sodium chloride 0.9 % bolus infusion 1,000 mL  1,000 mL IntraVENous ONCE  
 sodium chloride 0.9 % bolus infusion 1,000 mL  1,000 mL IntraVENous ONCE  
 insulin regular (NOVOLIN R, HUMULIN R) 100 Units in 0.9% sodium chloride 100 mL infusion  0-50 Units/hr IntraVENous TITRATE  dextrose 40% (GLUTOSE) oral gel 1 Tube  15 g Oral PRN  
 glucagon (GLUCAGEN) injection 1 mg  1 mg IntraMUSCular PRN  
 dextrose (D50W) injection syrg 12.5-25 g  25-50 mL IntraVENous PRN  
 [START ON 1/23/2020] cefepime (MAXIPIME) 1 g in 0.9% sodium chloride (MBP/ADV) 50 mL  1 g IntraVENous Q24H  
 famotidine (PF) (PEPCID) 20 mg in 0.9% sodium chloride 10 mL injection  20 mg IntraVENous Q12H Review of Systems Unable to perform ROS: Intubated Physical Exam 
Vitals:  
 01/22/20 1715 01/22/20 1730 01/22/20 1744 01/22/20 1801 BP: 105/71 94/63 112/66 Pulse: (!) 107 (!) 109 (!) 109 (!) 109 Resp: 20 20 20 20 SpO2: 95% 98% 96% 96% Weight:      
 
 
Physical Exam: 
Physical Exam 
Constitutional: Interventions: She is sedated, chemically paralyzed and intubated. Eyes:  
   Pupils: Pupils are equal, round, and reactive to light. Neck: Musculoskeletal: Normal range of motion. Cardiovascular:  
   Rate and Rhythm: Regular rhythm. Tachycardia present. Pulmonary:  
   Effort: She is intubated. Breath sounds: Decreased breath sounds present. Abdominal:  
   Palpations: Abdomen is soft. Skin: 
   General: Skin is warm. Neurological:  
   Comments: intubated and sedated Psychiatric:  
   Comments: Intubated and sedated Cardiographics Telemetry: ST 
ECG: atrial fibrillation, rate 94 Labs:  
Recent Labs  
  01/22/20 
1639 01/22/20 
1253  137  
K 3.6 4.0  
MG 2.7*  --   
BUN 65* 68* CREA 4.39* 4.59* * 196* WBC 18.0* 24.3* HGB 11.9 13.4 HCT 37.8 44.0  
PLT 86* 69* INR 4.1*  --   
 
 
 Assessment/Plan: 
 
 Assessment:  
  
Principal Problem: 
  Cardiac arrest -- secondary to acute sepsis metabolic process. Only able to provide supportive care at this time. Prognosis is grave. Active Problems: 
  Obesity, morbid Acute respiratory failure -- intubated Hypotension-- remains on 3 pressors Acute renal failure (ARF) -- repeat labs per protocol UTI (urinary tract infection) -- on abx Sepsis -- on abx and sepsis protocol initiated Thrombocytopenia - monitor closely with elevated INR and cooling protocol Thank you very much for this referral. We appreciate the opportunity to participate in this patient's care. We will follow along with above stated plan. Edwin Ortega NP Consulting MD: Debbie Nguyen Attending Addendum Patient independently seen and examined by me. Agree with above note by physician extender with the following additions and exceptions: 71 y.o. female with PMH of pAF (was on Xarelto), HTN, HLD, and renal stones, who presented to the ED from urology office after cardiac arrest 
 
Key findings are:  Intubated, sedated, arctic sun CV- RRR, distant S1, S2 
Lungs- decreased BS with B/L wheezes Ext- trace edema Plan: PEA arrest likely 2/2 severe sepsis c/b metabolic acidosis, severe renal failure, thrombocytopenia, and elevated INR of 4.1, continue supportive care, if Pt has recurrent ventricular arrhythmia would initiate amiodarone, currently undergoing post cardiac arrest hypothermia, BP improving with IVFs, holding anticoagulation in the setting of thrombocytopenia, elevated INR and critical illness Bruce Guo [address] [facility] Cardiology

## 2020-01-22 NOTE — PROCEDURES
Indication: 
 
Time out done and correct patient identified and correct incision/insertion site identified. Everyone Agrees For procedure sterile techniques used including: Sterile gown, gloves, cap, mask, drapes and chlorhexidine to the insertions site/location. Groin Region Sterilized with Chlorhexedine. Patient given lidocaine 1% for local anesthesia. Using sterile Technique right femoral arterial line placed without complication. Note resultant arterial waveform. Patient tolerated procedure well. Line sutured in place. Estimated Blood loss: 10 ml Rubi Buck MD 
 
Electronically signed.

## 2020-01-22 NOTE — ED PROVIDER NOTES
Patient presents from urology office after an apparent cardiac arrest while in examination room. She had been seen 2 days ago at an urgent care for pyelonephritis diagnosis and was following up in the office for that. She has been taking Cipro for the past 2 days. EMS reports the patient received CPR prior to their arrival in ACLS protocols were initiated. They report asystole is presenting rhythm is followed by his PEA they did subsequently obtain return of spontaneous circulation after administration of epinephrine and an apparent V. tach with defibrillation. Resulting rhythm was atrial fibrillation with positive pulses. The patient did receive amiodarone 1 dose and an intraosseous access was obtained in the right leg. Patient was intubated with Combitube. It is unresponsive although make respirations. Unable to give history or review of systems. Patient does have a history of atrial fibrillation is currently on flecainide and Xarelto for that. The history is provided by the patient and medical records. Cardiac arrest   
This is a new problem. The current episode started less than 1 hour ago. The problem has been resolved. Duration of episode(s) is 15 minutes. Associated with: Recent infection. Treatments tried: ACLS protocols for cardiac arrest per EMS. The treatment provided moderate (Return of spontaneous circulation but patient remains unresponsive.) relief. Risk factors: Atrial fibrillation history with concurrent use of flecainide and ciprofloxacin. Also pyelonephritis. Past Medical History:  
Diagnosis Date  Adverse effect of anesthesia   
 pt was told today (5/22/18) with her colonoscopy that she has JULEE  Atrial fibrillation ([facility] [address]   
 controlled with [facility] FU-   
 Back pain 3/7/2014  
 HTN (hypertension) 3/7/2014  Hypercholesterolemia  Hyperlipidemia 3/7/2014  Hypothyroidism 3/7/2014  Kidney stone  Thyroid disease Past Surgical History: Procedure Laterality Date  HX HEENT    
 septoplasty  HX HYSTERECTOMY    
 ARUNA  
 HX ORTHOPAEDIC Right   
 shoulder  HX ORTHOPAEDIC Left   
 scope knee Family History:  
Problem Relation Age of Onset  Arthritis-rheumatoid Mother  Diabetes Father  Hypertension Father  Coronary Artery Disease Father 54 MI   
 No Known Problems Sister  No Known Problems Brother  No Known Problems Brother Social History Socioeconomic History  Marital status:  Spouse name: Not on file  Number of children: Not on file  Years of education: Not on file  Highest education level: Not on file Occupational History  Not on file Social Needs  Financial resource strain: Not on file  Food insecurity:  
  Worry: Not on file Inability: Not on file  Transportation needs:  
  Medical: Not on file Non-medical: Not on file Tobacco Use  Smoking status: Never Smoker  Smokeless tobacco: Never Used Substance and Sexual Activity  Alcohol use: Yes Comment: occ  Drug use: No  
 Sexual activity: Not on file Lifestyle  Physical activity:  
  Days per week: Not on file Minutes per session: Not on file  Stress: Not on file Relationships  Social connections:  
  Talks on phone: Not on file Gets together: Not on file Attends Evangelical service: Not on file Active member of club or organization: Not on file Attends meetings of clubs or organizations: Not on file Relationship status: Not on file  Intimate partner violence:  
  Fear of current or ex partner: Not on file Emotionally abused: Not on file Physically abused: Not on file Forced sexual activity: Not on file Other Topics Concern  Not on file Social History Narrative  Not on file ALLERGIES: Penicillins Review of Systems All other systems reviewed and are negative. There were no vitals filed for this visit. Physical Exam 
Vitals signs and nursing note reviewed. HENT:  
   Head: Normocephalic and atraumatic. Nose: Nose normal.  
   Mouth/Throat:  
   Mouth: Mucous membranes are moist.  
   Pharynx: Oropharynx is clear. Eyes:  
   Extraocular Movements: Extraocular movements intact. Conjunctiva/sclera: Conjunctivae normal.  
   Pupils: Pupils are equal, round, and reactive to light. Neck: Musculoskeletal: Normal range of motion and neck supple. Cardiovascular:  
   Rate and Rhythm: Normal rate. Rhythm irregular. Pulses: Normal pulses. Pulmonary:  
   Effort: Pulmonary effort is normal.  
   Breath sounds: Normal breath sounds. Abdominal:  
   General: There is distension. Tenderness: There is no tenderness. Skin: 
   General: Skin is warm and dry. Neurological:  
   Comments: Unresponsive and intubated. Pupils are fixed, no blink or corneal reflexes noted. The patient did not receive any paralytics and there is no spontaneous movement at this time or posturing. Psychiatric:  
   Comments: Unresponsive and intubated MDM Number of Diagnoses or Management Options Diagnosis management comments: Differential diagnosis of etiology of cardiac arrest includes possible sepsis with a history of pyelonephritis also possible interaction between ciprofloxacin and flecainide. Amount and/or Complexity of Data Reviewed Clinical lab tests: ordered and reviewed Tests in the radiology section of CPT®: ordered and reviewed Review and summarize past medical records: yes Discuss the patient with other providers: yes Independent visualization of images, tracings, or specimens: yes (EKG at 1252: Is atrial fibrillation with a controlled ventricular response rate of 94 bpm.  Nonspecific ST abnormality is noted no ST segment elevation is noted. QRS duration and QT intervals normal.) Risk of Complications, Morbidity, and/or Mortality Presenting problems: high Diagnostic procedures: high Management options: high General comments: CRITICAL CARE Documentation: This patient is critically ill and there is a high probability of of imminent or life threatening deterioration in the patient's condition without immediate management. The nature of the patient's clinical problem is: Cardiac arrest with ROSC I have spent 45 minutes in direct patient care, documentation, review of labs/xrays/old records, discussion with Staff, Colleague . The time involved in the performance of separately reportable procedures was not counted toward critical care time. Dalia De Souza DO; 1/22/2020 @1:04 PM 
 
 
 
Critical Care Total time providing critical care: 30-74 minutes Patient Progress Patient progress: stable Intubation Date/Time: 1/22/2020 2:06 PM 
Performed by: Wai Camp DO Authorized by: Wai Camp DO Consent:  
  Consent obtained:  Emergent situation Pre-procedure details:  
  Patient status:  Unresponsive Pretreatment meds: Amidate. Paralytics:  Succinylcholine Procedure details:  
  Preoxygenation:  Bag valve mask CPR in progress: no Intubation method:  Oral 
  Oral intubation technique:  Video-assisted Laryngoscope blade:  Swapnil Solan 3 Tube size (mm):  7.0 Tube type:  Cuffed Number of attempts:  3 or more Ventilation between attempts: yes Cricoid pressure: yes Tube visualized through cords: yes Placement assessment: ETT to lip:  25 Tube secured with:  ETT alfredo Breath sounds:  Reduced on left Placement verification: CXR verification CXR findings:  ETT in right main stem Tube repositioned: yes Comments:  
   During initial attempts at securing the airway with an endotracheal tube is the patient's EMS inserted airway was not adequately protection for regurgitation that was occurring. First 2 attempts by me were unsuccessful. The patient was being bagged when there was regurgitation of stomach contents. There was possible aspiration presumed. Patient was subsequently successfully intubated by Dr. Venice Bazan

## 2020-01-23 NOTE — PROGRESS NOTES
Patient  with his family at the bedside. Her family members are grieving appropriately. A  group of family members were present.  provided empathy, comfort, a spiritual presence, emotional support and prayer. KORI Rodriguez

## 2020-01-23 NOTE — PROGRESS NOTES
[address] [facility] request Mrs. Chamorro's code status be changed to DNR at this time. Mrs. Rangel states,\"I've discussed with the other family members about her condition continuing to decline despite all of her treatment and we know she would not want to continue on. She has had CPR to many times and her oxygen levels are getting worse and not better. \" 
Dr. Vanessa Santiago notified of families wishes and order obtained to change code status to DNR.

## 2020-01-23 NOTE — PROGRESS NOTES
Bedside shift change report given to Leslie Kenny (oncoming nurse) by Alvin Ceja (offgoing nurse). Report included the following information Kardex, MAR and Recent Results. Mrs. Shakir Daley passed at 5. Asystole. Family at bedside. Anne Levi notified. Respiratory therapist notified. Versed and Fentanyl returned to pharmacy by Alvin Ceja.

## 2020-01-23 NOTE — DISCHARGE SUMMARY
Death Summary Katya Clemente Admission date:  1/22/2020 Discharge date:  1/23/2020 Admitting Diagnosis:  Cardiac arrest ([facility] [I46.9] Discharge Diagnosis:   
Problem List as of 1/23/2020 Date Reviewed: 1/22/2020 Codes Class Noted - Resolved * (Principal) Cardiac arrest [facility]) ICD-10-CM: I46.9 ICD-9-CM: 427.5  1/22/2020 - Present Acute respiratory failure ([facility] ICD-10-CM: J96.00 
ICD-9-CM: 518.81  1/22/2020 - Present Hypotension ICD-10-CM: I95.9 ICD-9-CM: 458.9  1/22/2020 - Present Acute renal failure (ARF) (HCC) ICD-10-CM: N17.9 ICD-9-CM: 584.9  1/22/2020 - Present UTI (urinary tract infection) ICD-10-CM: N39.0 ICD-9-CM: 599.0  1/22/2020 - Present Sepsis ([facility] ICD-10-CM: A41.9 ICD-9-CM: 038.9, 995.91  1/22/2020 - Present Thrombocytopenia ([facility] ICD-10-CM: D69.6 ICD-9-CM: 287.5  1/22/2020 - Present Paroxysmal atrial fibrillation (HCC) ICD-10-CM: I48.0 ICD-9-CM: 427.31  5/7/2018 - Present Obesity, morbid ([facility] (Chronic) ICD-10-CM: E66.01 
ICD-9-CM: 278.01  4/3/2018 - Present Hypothyroidism ICD-10-CM: E03.9 ICD-9-CM: 244.9  3/7/2014 - Present HTN (hypertension) ICD-10-CM: I10 
ICD-9-CM: 401.9  3/7/2014 - Present Hyperlipidemia ICD-10-CM: E78.5 ICD-9-CM: 272.4  3/7/2014 - Present Back pain ICD-10-CM: M54.9 ICD-9-CM: 724.5  3/7/2014 - Present Consultants: 
Cardiology Studies/Procedures: CXRs-multiple Head CT Hospital course:  Patient is a 71 y.o.  female seen and evaluated at the request of Dr. Nan Webber, ER physician. Had a witnessed arrest at urologist office today. Per chart she developed seizure activity and quit breathing. Resuscitation efforts initiated. EMS was called and required defib x1 with ROSC and sinus tach HR 160s. Was reported to have been in asystole then PEA then V-tach. She also received epi x2 and Amiodorone. Was reported to have been seen in today in follow up for pyelonephritis and recently placed on Cipro. Chronic medical:  Hypothyroidism, HLD, HTN, AFib (on Tambocor and Xarelto), nephrolithiasis, obesity, OA.  
 
Currently seen in the ER on vent support and being re-intubated by ER physician. Is unresponsive and has no corneal reflex on exam.  Was reported to have bee down for about 15 minutes. Barnes placed with thick chocolate colored urine. WBC 24, platelets 69, creat 6.56. While in the ER she suffered another arrest with ROSC but remains hypotensive, receiving IVFs and placing on pressor support. She vomited and OG placed with tan/bloody GI contents. Her roommate (her POA) and family were updated on all events. Unfortunately she suffered her 3rd arrest but quickly regained ROSC.   
  
Was admitted to the ICU on vent support and cardiology consulted. Was requiring maximum pressor support with triple drips. [employer] protocol initiated. Central line placed for IV access and arterial line for hemodynamic monitoring. O2 demands increased on max therapies and Siena Dubon advance category status to DNR and requested to start re-warming. With roommate and family at the bedside she . Final: 
--Pronounced dead at 07:25am. 
--Total discharge greater than 30 minutes in duration. Abraham Messina NP Lungs:  apneic Heart:  asystolic Additional Comments:  Patient pronounced dead at 7:25 am. Cause of death sepsis. I have spoken with and examined the patient. I agree with the above assessment and plan as documented.  
 
Walt Gomez MD

## 2020-01-23 NOTE — PROGRESS NOTES
O2 saturations dropping. Dr. Gaurav Schilling notified. Attempted to reposition Mrs. Chamorro to the supine position and O2 saturation dropped to 74%. FiO2 100%. Dr. Gaurav Schilling called to bedside.

## 2020-01-23 NOTE — PROGRESS NOTES
Gregoria Brooks) and family request that Lolly Sanes be set to start the rewarming process. Dr. Lindsey Sharp notified and order obtained to set Lolly Sanes to begin re-warming.

## 2020-01-23 NOTE — PROGRESS NOTES
Ventilator check complete; patient has a #7. 0 ET tube secured at the 23 at the teeth. Patient is sedated. Patient is not able to follow commands. Breath sounds are coarse. Trachea is midline, Negative for subcutaneous air, and chest excursion is symmetric. Patient is also Negative for cyanosis and is Positive for pitting edema. All alarms are set and audible. Resuscitation bag is at the head of the bed. Ventilator Settings Mode FIO2 Rate Tidal Volume Pressure PEEP I:E Ratio Pressure control  100 %   20      28 10 cm H20  1:2   
 
Peak airway pressure: 38 cm H2O Minute ventilation: 7.3 l/min ABG: No results for input(s): PH, PCO2, PO2, HCO3 in the last 72 hours.  
 
 
Orlie Settles, RT

## 2020-01-23 NOTE — PROGRESS NOTES
O2 saturation dropping to 86%. BIS 36 TOF=2 
 notified. Order obtained to increase tracrium to 2 mcg/kg/min. Per Dr. Yobany Perez if Mrs. Chamorro's O2 saturation continues to drop into the 70's we may consider placing her in a prone position. 100% FiO2 currently c Flolan infusing. Respiratory therapist at bedside and notified of plan per Juan Arias.

## 2020-01-23 NOTE — PROGRESS NOTES
Spiritual Care Visit, follow up visit. Patient death. Visited with patient's friends at bedside, and sister in Waiting Room. Prayed with both groups. Offered comfort in this time of bereavement. Ramana Chaudhary Prayed for patient's healing and health. Visit by Paul Gleason, Staff .  ARCELIA.Abelardo., Th.B., B.A.

## 2020-01-23 NOTE — PROGRESS NOTES
O2 saturation 80's.  and respiratory therapist notified. Order obtained to start Flolan aerosol and turn Mrs. Chamorro on left side.

## 2020-01-23 NOTE — PROGRESS NOTES
At bedside with Dr. Krysta Kohli, Ventilator turned off and vent tubing disconnected, ETT in place, patient not extubated.  with family @ bedside.

## 2020-01-25 LAB
BACTERIA SPEC CULT: NORMAL
SERVICE CMNT-IMP: NORMAL

## 2020-01-27 LAB
BACTERIA SPEC CULT: NORMAL
BACTERIA SPEC CULT: NORMAL
SERVICE CMNT-IMP: NORMAL
SERVICE CMNT-IMP: NORMAL

## 2022-12-05 NOTE — PROGRESS NOTES
"[facility]  Discharge Summary  Patient Name: Betty Bacon MRN: [id number]   Patient Class: IP- Inpatient  Length of Stay: 3   Admission Date: 12/1/2022  8:20 AM Attending Physician: Bashir Bermudez MD   Primary Care Provider: Johanne Callejas MD Face-to-Face encounter date: 12/04/2022   Chief Complaint: Shortness of Breath    Date of Discharge: 12/4/2022  Discharge Disposition: Home or Self Care  Condition: Stable       Brief History of Present Illness      74-year-old female with history of CAD status post stent and CKD HTN Former smoker/COPD on O2 at home, presented emergency department with complaints of shortness of breath gradually worsening over the past 3-4 days.  Patient also complains of generalized malaise and fatigue and lack of appetite, states that she has had episodes of chest pain for the past couple days last time was yesterday on retrosternal area with no radiation she denies vomit + nausea.  She was started on systemic steroids ceftriaxone doxycycline for pneumonia with COPD cardiology was consulted and evaluated the patient and cleared patient for discharge with follow-up at the office in 1 week patient's pressor nondistended and they be compliant with the plan.  Patient gradually improved at the time of discharge she has no respiratory distress she states that she is been walking around at her baseline she wants to go home at the time of discharge she is stable doing well so she was discharged.          Patient was seen and examined on the date of discharge and determined to be suitable for discharge.    Physical Exam  BP (!) 176/77 (BP Location: Left arm, Patient Position: Lying)   Pulse 68   Temp 97.5 °F (36.4 °C) (Oral)   Resp 20   Ht 5' 2" (1.575 m)   Wt 93.6 kg (206 lb 5.6 oz)   SpO2 97%   BMI 37.74 kg/m²   Vitals reviewed.    Constitutional: No distress.   HENT: Atraumatic.   Cardiovascular: Normal rate, regular rhythm and normal heart sounds.   Pulmonary/Chest: " Bedside shift report given to [facility]. Gtts dual verified. Effort normal. Clear to auscultation bilaterally. No wheezes.   Abdominal: Soft. Bowel sounds are normal. Exhibits no distension and no mass. No tenderness  Neurological: Alert.   Skin: Skin is warm and dry.     Following labs were Reviewed   Recent Labs   Lab 12/04/22  0122   CALCIUM 8.4*   *   K 4.1   CO2 25   CL 99   BUN 66*   CREATININE 1.6*     No results found for: POCTGLUCOSE         Microbiology Results (last 7 days)       Procedure Component Value Units Date/Time    Blood culture x two cultures. Draw prior to antibiotics. [[id number]] Collected: 12/01/22 1000    Order Status: Completed Specimen: Blood from Peripheral, Antecubital, Left Updated: 12/04/22 1232     Blood Culture, Routine No Growth to date      No Growth to date      No Growth to date      No Growth to date    Narrative:      Aerobic and anaerobic    Blood culture x two cultures. Draw prior to antibiotics. [[id number]] Collected: 12/01/22 1018    Order Status: Completed Specimen: Blood from Peripheral, Antecubital, Right Updated: 12/04/22 1232     Blood Culture, Routine No Growth to date      No Growth to date      No Growth to date      No Growth to date    Narrative:      Aerobic and anaerobic    Urine culture [[id number]] Collected: 12/01/22 1025    Order Status: Completed Specimen: Urine Updated: 12/04/22 0725     Urine Culture, Routine No growth    Urine Culture High Risk [[id number]]     Order Status: Completed Specimen: Urine, Clean Catch     Urine Culture High Risk [[id number]]     Order Status: Canceled Specimen: Urine, Clean Catch     Culture, Respiratory with Gram Stain [[id number]]     Order Status: Canceled Specimen: Respiratory           X-Ray Chest PA And Lateral   Final Result          No results found for this or any previous visit.    Instructions:  1. Take all medications as prescribed  2. Keep all follow-up appointments  3. Return to the hospital or call your primary care physicians if any worsening symptoms  occur.      Follow-Up Appointments:  Please call your primary care physician to schedule an appointment in 1 week time.  2.  Follow-up with cardiology in 1 week    Pending laboratory work/Tests to be performed/followed by the Primary care Physician:    The patient was discharged in the care of her parents//wife/family/caregiver, with discharge instructions were reviewed in written and verbal form. All pertinent questions were discussed and prescriptions were provided. The importance of making follow up appointments and compliance of medications has been stressed repeatedly. The patient will follow up in 1 week or sooner as needed with the PCP, and the patient is on board with the plan. Upon discharge, patient needs to be on following medications.    Discharge Medications:     Medication List        START taking these medications      cefdinir 300 MG capsule  Commonly known as: OMNICEF  Take 1 capsule (300 mg total) by mouth 2 (two) times daily. for 3 days     doxycycline 100 MG capsule  Commonly known as: MONODOX  Take 1 capsule (100 mg total) by mouth every 12 (twelve) hours. for 3 days     predniSONE 50 MG Tab  Commonly known as: DELTASONE  Take 1 tablet (50 mg total) by mouth once daily. for 5 days            CHANGE how you take these medications      atorvastatin 40 MG tablet  Commonly known as: LIPITOR  TAKE 1 TABLET BY MOUTH EVERY DAY  What changed: when to take this     clopidogreL 75 mg tablet  Commonly known as: PLAVIX  TAKE 1 TABLET BY MOUTH EVERY DAY  What changed: when to take this     clotrimazole-betamethasone 1-0.05% cream  Commonly known as: LOTRISONE  APPLY TO AFFECTED AREA TWICE A DAY  What changed:   how much to take  how to take this  additional instructions     colchicine 0.6 mg tablet  Commonly known as: COLCRYS  Take 2 po at gout flare onset, may repeat 1 in an hour prn, then 1 po bid until pain resolves ,or n/V/D starts  What changed:   how much to take  how to take this  when to take  this  reasons to take this     esomeprazole 40 MG capsule  Commonly known as: NEXIUM  TAKE 1 CAPSULE BY MOUTH BEFORE BREAKFAST.  What changed: additional instructions     isosorbide mononitrate 60 MG 24 hr tablet  Commonly known as: IMDUR  TAKE 1 TABLET BY MOUTH ONCE DAILY  What changed: when to take this     metoprolol succinate 50 MG 24 hr tablet  Commonly known as: TOPROL-XL  TAKE 1 TABLET BY MOUTH EVERY DAY  What changed:   when to take this  additional instructions     paroxetine 40 MG tablet  Commonly known as: PAXIL  TAKE 1 TABLET BY MOUTH EVERY DAY  What changed:   when to take this  additional instructions  Another medication with the same name was removed. Continue taking this medication, and follow the directions you see here.     QUEtiapine 100 MG Tab  Commonly known as: SEROQUEL  What changed: Another medication with the same name was removed. Continue taking this medication, and follow the directions you see here.            CONTINUE taking these medications      albuterol sulfate 90 mcg/actuation inhaler  Commonly known as: PROAIR RESPICLICK  Inhale 2 puffs into the lungs every 4 to 6 hours as needed.     ALPRAZolam 1 MG tablet  Commonly known as: XANAX  Take 1 tablet (1 mg total) by mouth 2 (two) times daily as needed for Anxiety.     aspirin 81 mg Tab     clonazePAM 1 MG disintegrating tablet  Commonly known as: KlonoPIN  Take 1 tablet (1 mg total) by mouth 2 (two) times daily as needed (anxiety).     colestipoL 1 gram Tab  Commonly known as: COLESTID  Take 1 tablet (1 g total) by mouth 2 (two) times daily as needed (diarrhea).     diclofenac sodium 1 % Gel  Commonly known as: VOLTAREN  Apply 2 g topically 3 (three) times daily as needed.     ergocalciferol 50,000 unit Cap  Commonly known as: ERGOCALCIFEROL  Take 1 capsule (50,000 Units total) by mouth every 7 days.     furosemide 20 MG tablet  Commonly known as: LASIX     Hydrocortisone 1%-Econazole 1% Topical Cream     spironolactone 25 MG  tablet  Commonly known as: ALDACTONE     tacrolimus 0.1 % ointment  Commonly known as: PROTOPIC  Apply topically 2 (two) times daily.     TRELEGY ELLIPTA 200-62.5-25 mcg inhaler  Generic drug: fluticasone-umeclidin-vilanter  INHALE 1 PUFF INTO THE LUNGS ONCE DAILY.     triamterene-hydrochlorothiazide 37.5-25 mg 37.5-25 mg per capsule  Commonly known as: DYAZIDE  TAKE 1 CAPSULE BY MOUTH ONCE DAILY     vitamin D 1000 units Tab  Commonly known as: VITAMIN D3            ASK your doctor about these medications      pimecrolimus 1 % cream  Commonly known as: ELIDEL  Apply topically 2 (two) times daily.               Where to Get Your Medications        These medications were sent to [facility]/pharmacy #[id number] - [place], [place] - [address] AT [facility]  [address], [place] [address]      Phone: [phone]   cefdinir 300 MG capsule  doxycycline 100 MG capsule  predniSONE 50 MG Tab           I spent 33 minutes preparing the discharge including reviewing records from previous encounters, preparation of discharge summary, assessing and final examination of the patient, discharge medicine reconciliation, discussing plan of care, follow up and education and prescriptions.       Bashir Bermudez  [facility] Hospitalist  12/04/2022

## (undated) DEVICE — KENDALL SCD EXPRESS SLEEVES, KNEE LENGTH, MEDIUM: Brand: KENDALL SCD